# Patient Record
Sex: MALE | URBAN - METROPOLITAN AREA
[De-identification: names, ages, dates, MRNs, and addresses within clinical notes are randomized per-mention and may not be internally consistent; named-entity substitution may affect disease eponyms.]

---

## 2023-06-05 ENCOUNTER — APPOINTMENT (OUTPATIENT)
Dept: URBAN - METROPOLITAN AREA CLINIC 256 | Age: 73
Setting detail: DERMATOLOGY
End: 2023-06-06

## 2023-06-05 VITALS — HEIGHT: 69 IN | WEIGHT: 175 LBS

## 2023-06-05 DIAGNOSIS — D18.0 HEMANGIOMA: ICD-10-CM

## 2023-06-05 DIAGNOSIS — Z71.89 OTHER SPECIFIED COUNSELING: ICD-10-CM

## 2023-06-05 DIAGNOSIS — L82.0 INFLAMED SEBORRHEIC KERATOSIS: ICD-10-CM

## 2023-06-05 DIAGNOSIS — L57.8 OTHER SKIN CHANGES DUE TO CHRONIC EXPOSURE TO NONIONIZING RADIATION: ICD-10-CM

## 2023-06-05 DIAGNOSIS — L82.1 OTHER SEBORRHEIC KERATOSIS: ICD-10-CM

## 2023-06-05 DIAGNOSIS — D22 MELANOCYTIC NEVI: ICD-10-CM

## 2023-06-05 PROBLEM — D22.39 MELANOCYTIC NEVI OF OTHER PARTS OF FACE: Status: ACTIVE | Noted: 2023-06-05

## 2023-06-05 PROBLEM — D22.61 MELANOCYTIC NEVI OF RIGHT UPPER LIMB, INCLUDING SHOULDER: Status: ACTIVE | Noted: 2023-06-05

## 2023-06-05 PROBLEM — D22.71 MELANOCYTIC NEVI OF RIGHT LOWER LIMB, INCLUDING HIP: Status: ACTIVE | Noted: 2023-06-05

## 2023-06-05 PROBLEM — D22.72 MELANOCYTIC NEVI OF LEFT LOWER LIMB, INCLUDING HIP: Status: ACTIVE | Noted: 2023-06-05

## 2023-06-05 PROBLEM — D18.01 HEMANGIOMA OF SKIN AND SUBCUTANEOUS TISSUE: Status: ACTIVE | Noted: 2023-06-05

## 2023-06-05 PROBLEM — D22.5 MELANOCYTIC NEVI OF TRUNK: Status: ACTIVE | Noted: 2023-06-05

## 2023-06-05 PROBLEM — D22.62 MELANOCYTIC NEVI OF LEFT UPPER LIMB, INCLUDING SHOULDER: Status: ACTIVE | Noted: 2023-06-05

## 2023-06-05 PROCEDURE — OTHER LIQUID NITROGEN: OTHER

## 2023-06-05 PROCEDURE — 99213 OFFICE O/P EST LOW 20 MIN: CPT | Mod: 25

## 2023-06-05 PROCEDURE — OTHER MIPS QUALITY: OTHER

## 2023-06-05 PROCEDURE — OTHER COUNSELING: OTHER

## 2023-06-05 PROCEDURE — 17110 DESTRUCT B9 LESION 1-14: CPT

## 2023-06-05 ASSESSMENT — LOCATION SIMPLE DESCRIPTION DERM
LOCATION SIMPLE: LEFT UPPER ARM
LOCATION SIMPLE: LEFT POSTERIOR THIGH
LOCATION SIMPLE: RIGHT POSTERIOR THIGH
LOCATION SIMPLE: LEFT CHEEK
LOCATION SIMPLE: RIGHT ANTERIOR NECK
LOCATION SIMPLE: LEFT LOWER BACK
LOCATION SIMPLE: RIGHT UPPER ARM

## 2023-06-05 ASSESSMENT — LOCATION DETAILED DESCRIPTION DERM
LOCATION DETAILED: RIGHT CLAVICULAR NECK
LOCATION DETAILED: LEFT DISTAL POSTERIOR UPPER ARM
LOCATION DETAILED: LEFT INFERIOR MEDIAL MIDBACK
LOCATION DETAILED: LEFT DISTAL POSTERIOR THIGH
LOCATION DETAILED: RIGHT DISTAL POSTERIOR UPPER ARM
LOCATION DETAILED: LEFT INFERIOR CENTRAL MALAR CHEEK
LOCATION DETAILED: RIGHT DISTAL POSTERIOR THIGH
LOCATION DETAILED: LEFT SUPERIOR MEDIAL MIDBACK

## 2023-06-05 ASSESSMENT — LOCATION ZONE DERM
LOCATION ZONE: NECK
LOCATION ZONE: LEG
LOCATION ZONE: FACE
LOCATION ZONE: TRUNK
LOCATION ZONE: ARM

## 2023-06-05 NOTE — PROCEDURE: LIQUID NITROGEN
Duration Of Freeze Thaw-Cycle (Seconds): 5-10
Spray Paint Text: The liquid nitrogen was applied to the skin utilizing a spray paint frosting technique.
Render Post-Care Instructions In Note?: no
Post-Care Instructions: I reviewed with the patient in detail post-care instructions. Patient is to wear sunprotection, and avoid picking at any of the treated lesions. Pt may apply Vaseline to crusted or scabbing areas.
Medical Necessity Clause: This procedure was medically necessary because the lesions that were treated were:
Number Of Freeze-Thaw Cycles: 1 freeze-thaw cycle
Show Topical Anesthesia Variable?: Yes
Medical Necessity Information: It is in your best interest to select a reason for this procedure from the list below. All of these items fulfill various CMS LCD requirements except the new and changing color options.
Detail Level: Detailed
Consent: The patient's consent was obtained including but not limited to risks of crusting, scabbing, blistering, scarring, darker or lighter pigmentary change, recurrence, incomplete removal and infection.
Application Tool (Optional): Liquid Nitrogen Sprayer

## 2023-06-05 NOTE — PROCEDURE: MIPS QUALITY
Quality 431: Preventive Care And Screening: Unhealthy Alcohol Use - Screening: Patient not identified as an unhealthy alcohol user when screened for unhealthy alcohol use using a systematic screening method
Detail Level: Detailed
Quality 111:Pneumonia Vaccination Status For Older Adults: Patient received any pneumococcal conjugate or polysaccharide vaccine on or after their 60th birthday and before the end of the measurement period
Quality 130: Documentation Of Current Medications In The Medical Record: Current Medications Documented
Quality 226: Preventive Care And Screening: Tobacco Use: Screening And Cessation Intervention: Patient screened for tobacco use and is an ex/non-smoker
Quality 110: Preventive Care And Screening: Influenza Immunization: Influenza Immunization Administered during Influenza season
Quality 47: Advance Care Plan: Advance Care Planning discussed and documented in the medical record; patient did not wish or was not able to name a surrogate decision maker or provide an advance care plan.

## 2023-06-05 NOTE — HPI: FULL BODY SKIN EXAMINATION
What Is The Reason For Today's Visit?: Full Body Skin Examination
What Is The Reason For Today's Visit? (Being Monitored For X): concerning skin lesions on an annual basis
Additional History: The patient has areas of concern on the right shoulder that he states are irritated and itchy.

## 2024-10-23 ENCOUNTER — APPOINTMENT (OUTPATIENT)
Dept: URBAN - METROPOLITAN AREA CLINIC 256 | Age: 74
Setting detail: DERMATOLOGY
End: 2024-10-23

## 2024-10-23 VITALS — WEIGHT: 170 LBS | HEIGHT: 69 IN

## 2024-10-23 DIAGNOSIS — L57.0 ACTINIC KERATOSIS: ICD-10-CM

## 2024-10-23 DIAGNOSIS — L57.8 OTHER SKIN CHANGES DUE TO CHRONIC EXPOSURE TO NONIONIZING RADIATION: ICD-10-CM

## 2024-10-23 DIAGNOSIS — D22 MELANOCYTIC NEVI: ICD-10-CM

## 2024-10-23 DIAGNOSIS — D18.0 HEMANGIOMA: ICD-10-CM

## 2024-10-23 DIAGNOSIS — L82.1 OTHER SEBORRHEIC KERATOSIS: ICD-10-CM

## 2024-10-23 DIAGNOSIS — D485 NEOPLASM OF UNCERTAIN BEHAVIOR OF SKIN: ICD-10-CM

## 2024-10-23 DIAGNOSIS — Z71.89 OTHER SPECIFIED COUNSELING: ICD-10-CM

## 2024-10-23 DIAGNOSIS — Z85.828 PERSONAL HISTORY OF OTHER MALIGNANT NEOPLASM OF SKIN: ICD-10-CM

## 2024-10-23 PROBLEM — D22.72 MELANOCYTIC NEVI OF LEFT LOWER LIMB, INCLUDING HIP: Status: ACTIVE | Noted: 2024-10-23

## 2024-10-23 PROBLEM — D18.01 HEMANGIOMA OF SKIN AND SUBCUTANEOUS TISSUE: Status: ACTIVE | Noted: 2024-10-23

## 2024-10-23 PROBLEM — D48.5 NEOPLASM OF UNCERTAIN BEHAVIOR OF SKIN: Status: ACTIVE | Noted: 2024-10-23

## 2024-10-23 PROBLEM — D22.5 MELANOCYTIC NEVI OF TRUNK: Status: ACTIVE | Noted: 2024-10-23

## 2024-10-23 PROBLEM — D22.71 MELANOCYTIC NEVI OF RIGHT LOWER LIMB, INCLUDING HIP: Status: ACTIVE | Noted: 2024-10-23

## 2024-10-23 PROBLEM — D22.39 MELANOCYTIC NEVI OF OTHER PARTS OF FACE: Status: ACTIVE | Noted: 2024-10-23

## 2024-10-23 PROBLEM — D22.61 MELANOCYTIC NEVI OF RIGHT UPPER LIMB, INCLUDING SHOULDER: Status: ACTIVE | Noted: 2024-10-23

## 2024-10-23 PROBLEM — D22.62 MELANOCYTIC NEVI OF LEFT UPPER LIMB, INCLUDING SHOULDER: Status: ACTIVE | Noted: 2024-10-23

## 2024-10-23 PROCEDURE — OTHER EDUCATIONAL RESOURCES PROVIDED: OTHER

## 2024-10-23 PROCEDURE — 11102 TANGNTL BX SKIN SINGLE LES: CPT

## 2024-10-23 PROCEDURE — OTHER MIPS QUALITY: OTHER

## 2024-10-23 PROCEDURE — OTHER PHOTO-DOCUMENTATION: OTHER

## 2024-10-23 PROCEDURE — OTHER LIQUID NITROGEN: OTHER

## 2024-10-23 PROCEDURE — 17003 DESTRUCT PREMALG LES 2-14: CPT

## 2024-10-23 PROCEDURE — 17000 DESTRUCT PREMALG LESION: CPT | Mod: 59

## 2024-10-23 PROCEDURE — 99213 OFFICE O/P EST LOW 20 MIN: CPT | Mod: 25

## 2024-10-23 PROCEDURE — OTHER BIOPSY BY SHAVE METHOD: OTHER

## 2024-10-23 PROCEDURE — OTHER COUNSELING: OTHER

## 2024-10-23 ASSESSMENT — LOCATION DETAILED DESCRIPTION DERM
LOCATION DETAILED: LEFT LATERAL ABDOMEN
LOCATION DETAILED: LEFT VENTRAL PROXIMAL FOREARM
LOCATION DETAILED: RIGHT DISTAL POSTERIOR THIGH
LOCATION DETAILED: LEFT LATERAL INFERIOR CHEST
LOCATION DETAILED: RIGHT MEDIAL SUPERIOR CHEST
LOCATION DETAILED: LEFT MID-UPPER BACK
LOCATION DETAILED: RIGHT ANTERIOR DISTAL THIGH
LOCATION DETAILED: LEFT DISTAL POSTERIOR UPPER ARM
LOCATION DETAILED: LEFT CENTRAL BUCCAL CHEEK
LOCATION DETAILED: LEFT DISTAL POSTERIOR THIGH
LOCATION DETAILED: RIGHT DISTAL POSTERIOR UPPER ARM
LOCATION DETAILED: LEFT INFERIOR MEDIAL MIDBACK
LOCATION DETAILED: LEFT MEDIAL MALAR CHEEK
LOCATION DETAILED: RIGHT MEDIAL MALAR CHEEK
LOCATION DETAILED: LEFT ANTERIOR PROXIMAL THIGH
LOCATION DETAILED: LEFT INFERIOR CENTRAL MALAR CHEEK
LOCATION DETAILED: RIGHT VENTRAL DISTAL FOREARM
LOCATION DETAILED: RIGHT NASAL ALA

## 2024-10-23 ASSESSMENT — LOCATION SIMPLE DESCRIPTION DERM
LOCATION SIMPLE: LEFT POSTERIOR THIGH
LOCATION SIMPLE: RIGHT THIGH
LOCATION SIMPLE: LEFT THIGH
LOCATION SIMPLE: LEFT LOWER BACK
LOCATION SIMPLE: LEFT FOREARM
LOCATION SIMPLE: ABDOMEN
LOCATION SIMPLE: LEFT UPPER ARM
LOCATION SIMPLE: LEFT UPPER BACK
LOCATION SIMPLE: RIGHT UPPER ARM
LOCATION SIMPLE: RIGHT FOREARM
LOCATION SIMPLE: CHEST
LOCATION SIMPLE: LEFT CHEEK
LOCATION SIMPLE: RIGHT NOSE
LOCATION SIMPLE: RIGHT POSTERIOR THIGH
LOCATION SIMPLE: RIGHT CHEEK

## 2024-10-23 ASSESSMENT — LOCATION ZONE DERM
LOCATION ZONE: TRUNK
LOCATION ZONE: LEG
LOCATION ZONE: NOSE
LOCATION ZONE: FACE
LOCATION ZONE: ARM

## 2024-10-23 NOTE — HPI: FULL BODY SKIN EXAMINATION
What Is The Reason For Today's Visit?: Full Body Skin Examination
What Is The Reason For Today's Visit? (Being Monitored For X): the development of new lesions
Additional History: Left cheek, 2 weeks, bleeding, not healing

## 2024-10-23 NOTE — PROCEDURE: BIOPSY BY SHAVE METHOD
Detail Level: Detailed
Depth Of Biopsy: dermis
Was A Bandage Applied: Yes
Size Of Lesion In Cm: 0
Anticipated Plan (Based On Presumed Biopsy Results): Ald[place]
Biopsy Type: H and E
Biopsy Method: Dermablade
Anesthesia Type: 1% lidocaine with epinephrine
Anesthesia Volume In Cc: 0.5
Hemostasis: Drysol
Wound Care: Petrolatum
Dressing: bandage
Destruction After The Procedure: No
Type Of Destruction Used: Curettage
Curettage Text: The wound bed was treated with curettage after the biopsy was performed.
Cryotherapy Text: The wound bed was treated with cryotherapy after the biopsy was performed.
Electrodesiccation Text: The wound bed was treated with electrodesiccation after the biopsy was performed.
Electrodesiccation And Curettage Text: The wound bed was treated with electrodesiccation and curettage after the biopsy was performed.
Silver Nitrate Text: The wound bed was treated with silver nitrate after the biopsy was performed.
Lab: -[id number]
Path Notes (To The Dermatopathologist): Please check margins.
Consent: Written consent was obtained and risks were reviewed including but not limited to scarring, infection, bleeding, scabbing, incomplete removal, nerve damage and allergy to anesthesia.
Post-Care Instructions: I reviewed with the patient in detail post-care instructions. Patient is to keep the biopsy site dry overnight, and then apply bacitracin twice daily until healed. Patient may apply hydrogen peroxide soaks to remove any crusting.
Notification Instructions: Patient will be notified of biopsy results. However, patient instructed to call the office if not contacted within 2 weeks.
Billing Type: Third-Party Bill
Information: Selecting Yes will display possible errors in your note based on the variables you have selected. This validation is only offered as a suggestion for you. PLEASE NOTE THAT THE VALIDATION TEXT WILL BE REMOVED WHEN YOU FINALIZE YOUR NOTE. IF YOU WANT TO FAX A PRELIMINARY NOTE YOU WILL NEED TO TOGGLE THIS TO 'NO' IF YOU DO NOT WANT IT IN YOUR FAXED NOTE.

## 2024-10-28 ENCOUNTER — RX ONLY (RX ONLY)
Age: 74
End: 2024-10-28

## 2024-10-28 RX ORDER — IMIQUIMOD 12.5 MG/.25G
CREAM TOPICAL
Qty: 12 | Refills: 0 | Status: ERX | COMMUNITY
Start: 2024-10-28

## 2024-11-11 ENCOUNTER — TRANSCRIBE ORDERS (OUTPATIENT)
Dept: ONCOLOGY | Facility: CLINIC | Age: 74
End: 2024-11-11
Payer: COMMERCIAL

## 2024-11-11 ENCOUNTER — PATIENT OUTREACH (OUTPATIENT)
Dept: ONCOLOGY | Facility: CLINIC | Age: 74
End: 2024-11-11
Payer: COMMERCIAL

## 2024-11-11 DIAGNOSIS — C61 MALIGNANT NEOPLASM OF PROSTATE (H): Primary | ICD-10-CM

## 2024-11-11 NOTE — PROGRESS NOTES
New Patient Oncology Nurse Navigator Note     Referring provider:   Dave Rodriguez MD  [facility], [place], [place]      Referred to (specialty): Medical Oncology    Requested provider (if applicable):   Dr. Pearson or Dr. Hightower     Date Referral Received:   11/11/24     Evaluation for :   Second opinion of prostate cancer     Clinical History (per Nurse review of records provided):    Per note of Dr. George:    Hematology/Oncology Progress Note     Diagnosis:  1.                       Metastatic Prostate Cancer to the bones (spine, pelvis, and sternum)  2.                       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                       History of BCC of face     Treatment:  1.                       (11/24/2023 - 12/15/2023) Initiated Casodex  2.                       (12/11/2023) Started Lupron and Docetaxel  a.                       (03/26/2024) Completed 6 cycles of Docetaxel.   b.                      (Present) Lupron Q3M  3.                       (01/02/2024 - Present) Darolutomide      History of Present Illness:   Jd Restrepo is a 74 y.o. male with history of left nephrectomy, HLD, GERD,  asthma, newly diagnosed metastatic prostate cancer (extensive bone mets), and right renal mass (biopsy c/w papillary renal cell carcinoma) who presents to clinic on 12/11/23 for initiation of Lupron and Docetaxel.      09/16/2023 Urology visit for lower urinary tract symptoms. Relatively low PSA in the past. 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   - Bladder US showed an enlarged prostate measuring 6.2 x 4.3 x 4.7 cm  - Cystoscopy was completed due to urinary retention. This showed severe trabeculation fo the baldder, he had enlarged prostate with an intravesical protion and lateral lobe enlargement. Discussed TURP procedure. Plan was to  undergo a Urodynamic study.      11/16/2023   - PCP visit with Jarvis Gaxiola MD for fever. There was urinary retention and self cathing. Appeared septic with tachycardia, hypotension, and tachypnea. There is some loss of appetite. Referred to ED for admission.   - CT CAP showed no convincing evidence to confirm an etiology for fever in the chest, abdomen, or pelvis. Multiple small indistinct and infiltrative-appearing sclerotic areas throughout the spine, pelvis, and sternum. Metastatic disease cannot be excluded. Prostate-specific antigen elevation from 10/11/2023 (19.9 at that time) raises suspicion for the prostate as a source. Indeterminate density 2.6 cm structure in the right kidney. This could potentially represent a hemorrhagic/proteinaceous cyst, but a hypoenhancing solid mass cannot be excluded.     11/17/2023 Right iliac bone biopsy. Pathology showed metastatic adenocarcinoma, consistent with prostate primary.      11/28/2023: Right kidney needle core biopsy. Pathology showed renal cell carcinoma, favor papillary type, Grade 2     12/05/2023 Pet/CT scan showed findings suspicious for left prostate gland adenocarcinoma with widespread osseous metastases. No radiotracer uptake is identified within the biopsy-proven right papillary renal carcinoma.     01/23/2024 No BRACA 1 or 2 mutations. There was a + PODL1 mutation of unknown significance.      05/01/2024  - CT CAP showed no findings for new or progressive sites of metastatic disease. Increased density of diffuse sclerotic metastases may represent healing change. Stable right RCC.  - Bone scan showed widespread osteoblastic metastatic disease.      06/05/2024 Prostate chips, transurethral resection. Pathology showed prostatic adenocarcinoma, Grade Group 5, Patterson grade 5+5 (Score 10). 7% tissue involvement.      Interim History:  Jd Restrepo is a 74 y.o. male who presents to the clinic today for a follow up on metastatic prostate cancer. He  completed chemotherapy with Docetaxel on 2024. He has been on Lupron since 2023 and Darolutamide was later started on 2024. He also recently underwent a TURP procedure on 2024 and feels this provided some symptomatic improvement with the urinary symptoms. Otherwise, he feels his stamina has diminished since being on treatment. He enjoys playing golf but notes he used to be able to carry his bags up and down hills and walk at least 5 miles, but he can't do that now. He also feels his strength is decreased as well. He is currently on calcium and folic acid supplementation. He is struggling with waking up several nights and it's hard to get back to sleep but notes Lorazepam really improved that and he would like to know if this is a good option for him to use PRN. His next bone and CT scan is scheduled for 2024. He is due to receive his next Lupron dose.      Social History:   Marital status:   to [place] with 2 boys  Current Tobacco use: Never  Current Alcohol Use: 1 - 2 drinks per day (Wine/Liquor) - None lately  Daily Activities: Retired from [employer]. 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 Enjoys Golf.   They do travel during the Winter - Next Travel is in 2024.      Physical Exam:  Vitals reviewed in EPIC.  General: A/O, NAD.  HEENT: MMM, no lesions. Neck supple.   CV: RRR, no murmurs.  Lungs: CTAB, no crackles.  Abd: Soft, NTND.  Lymph: No cervical, supraclavicular, axillary LAD.  Extrem: No vertebral TTP. No edema.  Skin: No jaundice or petechaie.  Neuro: Grossly normal strength/sensation.   Psych: appropriate mood and affect.      Labs:   2024 labs was reviewed in epic, including CBC, CMP, LFT's, Proteins, PSA, and Iron Studies. Lab trends was reviewed. PSA remains undetectable.      Imagin2024 Bone scan report and images was personally reviewed.         NM Bone Scan Whole Body  Order: [id number]   Status: Final result       Visible to patient: Yes (seen)        Next appt: 11/08/2024 at 02:00 PM in Chemo Therapy/Infusion Services (Oncology)       Dx: Prostate cancer metastatic to bone ([facility])    Details     Reading Physician Reading Date Result Priority   Tre Jacobo MD  [phone] 11/5/2024 Routine      Narrative & Impression  TECHNIQUE:  The procedure was performed with 21.3 mCi Tc-99m MDP.  Anterior and posterior whole body images were obtained.     COMPARISON:  Bone scan 5/01/2024.     FINDINGS:  Similar findings of widespread osseous metastases involving the axial and appendicular skeleton. No definite new or more intense sites of involvement. Most intense lesion is the right first rib. The right kidney is visualized. Left nephrectomy changes.     IMPRESSION  No significant interval change in diffuse tracer avid osseous metastases.         COMPARISON: CT chest, abdomen, and pelvis, 5/1/2024.     TECHNIQUE:  Images were obtained through the chest, abdomen and pelvis following the administration of 75 mL IOHEXOL 350 MG/ML IV SOLN IV contrast.      FINDINGS:     CHEST:  CHEST WALL AND LOWER NECK: Thyroid gland is unremarkable. No abnormal axillary lymph nodes.     HEART AND VASCULATURE: Normal heart size. No pericardial effusion. No thoracic aortic aneurysm.      MEDIASTINUM: Unremarkable esophagus. No adenopathy.     LUNGS AND PLEURAL SPACE: Patent central airways. Improving reticulonodular opacities in the periphery of the lower lobes, right greater than left, since the 5/1/2024 CT exam. 2 additional sub-4 mm solid pulmonary nodules are unchanged. For example, a 2 mm solid juxtapleural nodule in the right upper lobe is unchanged (series 3, image 48). Linear scarring in the inferior aspect of the left lower lobe. No pleural effusion.     ABDOMEN/PELVIS:  LIVER: Stable 5 mm low-attenuation lesion in the right lobe of the liver (series 2, image 60). Simple cyst in the liver adjacent to the gallbladder fossa (series 2, image 57). Otherwise unremarkable.      GALLBLADDER AND BILIARY TREE: Unremarkable. No intrahepatic or extrahepatic biliary ductal dilation.      PANCREAS: Unremarkable.     SPLEEN: Unremarkable.     ADRENALS: Unremarkable.     KIDNEYS, URETERS, AND BLADDER: Stable postoperative changes from a left nephrectomy. 25 x 21 mm low-attenuation lesion in the interpolar region of the right kidney previously measured 28 x 24 mm (series 2, and 67). Biopsy-proven renal cell carcinoma. Additional simple cysts in the right kidney. No hydronephrosis. Circumferential bladder wall thickening accentuated secondary to incomplete distention.     VESSELS: Mild atherosclerotic calcifications of the abdominal aorta and iliac vessels. No abdominal aortic aneurysm.     BOWEL: Colonic diverticulosis without acute diverticulitis. Small bowel normal in caliber. Appendix unremarkable.     REPRODUCTIVE ORGANS: Postoperative changes from a TURP. 7 mm nodule within the resection cavity (series 2, image 119).     MESENTERY/PERITONEUM: No enlarged mesenteric lymph nodes. No ascites or free air. No focal fluid collection.      RETROPERITONEUM: No adenopathy.      ABDOMINAL WALL/SOFT TISSUES: Small fat-containing umbilical hernia.     BONES: Diffuse sclerotic lesions throughout the visualized skeleton are not significantly changed since the 5/1/2024 CT exam. Degenerative changes in the spine. No acute osseous abnormality.     IMPRESSION  1. Diffuse sclerotic metastasis throughout the visualized skeleton not significantly changed since the 5/1/2024 CT exam.  2. Interval decrease in size of a biopsy-proven renal cell carcinoma in the right kidney.  3. Stable postoperative changes from a left nephrectomy.  4. Postoperative changes from a TURP.  5. Left-sided colonic diverticulosis without acute diverticulitis.     Assessment and Plan:   #. Stage IV Metastatic Prostate Cancer Castrate Sensitive to the bones (spine, pelvis, and sternum) with extensive bone mets which would be considered  high-risk, high-volume     Goal of Treatment: Palliative, aim to alleviate/prevent symptoms and prolong survival, but without expectation of cure.     Disease Status: S/p 6 cycles of Docetaxel (03/26/2024)     PSA now undetectable sine 03/26/2024. Discussed trend of alkaline phosphatase improving; now 69 from 791 on 01/23/2024.     Treatment Plan: Continue Lupron Q3M (Due 11/12/2024) and Daralutomide      Scans show partial remission. Plan is to continue Lupron and Daralutomide. The PSA will determine if there is any changes therefore we will get labs Q3M moving forward. Next scans will be due in 3 months (CT CAP and Bone Scan).     #. Anemia and Fatigue              - Anemia is resolved. Can now stop folic acid. I think some of his fatigue issues are due to the Lupron.      #. Insomnia              -  Ok to continue Ativan on a PRN basis. We did previously discuss alternative options including Temazepam which could last for longer increments, but we will stay with Ativan for now and revisit this should he require this more often.      #. Bone Mets  - I think the bone scans are more indicative of healing bone and resolving extensive marrow involvement but we will take a closer look as we go forward. Next scans in 3 months.      #. Bladder incontinence and Renal Cell Papillary Carcinoma  - TURP completed on 06/05/2024 by Dr. Garcia with symptomatic improvement s/p procedure. He will continue to follow with Urology. Await next scans and then potentially discuss with Dr. Garcia the plans moving forward.             Dave Rodriguez MD, MS          Records Location (Care Everywhere, Media, etc.):  RECORDS NEEDED:  [facility]  Upcoming [facility] PET on  11/26/24  [facility] testing     Additional testing needed prior to consult: [facility] PET 11/26    I called Jd to discuss referral to oncology.  I introduced my role and reviewed what this consult visit will entail/what to expect.  I reviewed the location and gave contact  numbers including new patient scheduling and clinic phone numbers.   Jd has no other questions at this time.    I let Jd know that once records are in process our new patient scheduling team with be calling to finalize scheduling for the following plan:    PLAN:   1) SCHEDULE: Hold: Dr. Hightower, 12/2, [facility] [place], 10-11 AM, NEW, in person  2) Schedule for a lab draw following the consultation      Regla Copeland RN, BSN  Oncology New Patient Nurse Navigator   [facility]  [phone]

## 2024-11-12 ENCOUNTER — PRE VISIT (OUTPATIENT)
Dept: ONCOLOGY | Facility: CLINIC | Age: 74
End: 2024-11-12
Payer: COMMERCIAL

## 2024-11-12 NOTE — TELEPHONE ENCOUNTER
RECORDS STATUS - ALL OTHER DIAGNOSIS      RECORDS RECEIVED FROM:    Appt Date: TBD NN WQ    - Malignant neoplasm of prostate ([facility])     Action    Action Taken 11/12/2024 10:59AM REVA     I called [facility] Ph: [phone] - unavailable. I sent a fax request.       NOTES STATUS DETAILS   OFFICE NOTE from referring provider    Dave Rodriguez MD      OFFICE NOTE from medical oncologist     DISCHARGE SUMMARY from hospital     DISCHARGE REPORT from the ER     OPERATIVE REPORT     MEDICATION LIST     CLINICAL TRIAL TREATMENTS TO DATE     LABS     PATHOLOGY REPORTS     ANYTHING RELATED TO DIAGNOSIS CE Labs last updated on 11/5/2024    PATHOLOGY [facility] TRACKING   TRACKING #:   GENONOMIC TESTING     TYPE:     IMAGING (NEED IMAGES & REPORT)     CT SCANS CE CT Chest Abdomen Pelvis 11/5/2024, 5/1/2024, 11/16/2023     MRI     NM Bone Scan CE 11/5/2024, 5/1/2024    DXA Bone Density  CE  4/26/2024    Xray Chest CE  11/16/2023   IMAGE DISC FEDEX TRACKING   TRACKING #:

## 2024-11-14 ENCOUNTER — APPOINTMENT (OUTPATIENT)
Dept: URBAN - METROPOLITAN AREA CLINIC 256 | Age: 74
Setting detail: DERMATOLOGY
End: 2024-11-14

## 2024-11-14 DIAGNOSIS — L24.4 IRRITANT CONTACT DERMATITIS DUE TO DRUGS IN CONTACT WITH SKIN: ICD-10-CM

## 2024-11-14 PROCEDURE — 99213 OFFICE O/P EST LOW 20 MIN: CPT

## 2024-11-14 PROCEDURE — OTHER EDUCATIONAL RESOURCES PROVIDED: OTHER

## 2024-11-14 PROCEDURE — OTHER PHOTO-DOCUMENTATION: OTHER

## 2024-11-14 PROCEDURE — OTHER PRESCRIPTION MEDICATION MANAGEMENT: OTHER

## 2024-11-14 PROCEDURE — OTHER COUNSELING: OTHER

## 2024-11-14 PROCEDURE — OTHER MIPS QUALITY: OTHER

## 2024-11-14 ASSESSMENT — SEVERITY ASSESSMENT 2020: SEVERITY 2020: SEVERE

## 2024-11-14 ASSESSMENT — LOCATION DETAILED DESCRIPTION DERM: LOCATION DETAILED: LEFT CENTRAL BUCCAL CHEEK

## 2024-11-14 ASSESSMENT — LOCATION ZONE DERM: LOCATION ZONE: FACE

## 2024-11-14 ASSESSMENT — PAIN INTENSITY VAS: HOW INTENSE IS YOUR PAIN 0 BEING NO PAIN, 10 BEING THE MOST SEVERE PAIN POSSIBLE?: NO PAIN

## 2024-11-14 ASSESSMENT — LOCATION SIMPLE DESCRIPTION DERM: LOCATION SIMPLE: LEFT CHEEK

## 2024-11-14 NOTE — PROCEDURE: PRESCRIPTION MEDICATION MANAGEMENT
Render In Strict Bullet Format?: No
Detail Level: Zone
Discontinue Regimen: Imiquimod 5% Topical Cream

## 2024-12-02 ENCOUNTER — ONCOLOGY VISIT (OUTPATIENT)
Dept: ONCOLOGY | Facility: CLINIC | Age: 74
End: 2024-12-02
Attending: INTERNAL MEDICINE
Payer: COMMERCIAL

## 2024-12-02 VITALS
HEART RATE: 74 BPM | DIASTOLIC BLOOD PRESSURE: 89 MMHG | BODY MASS INDEX: 26.52 KG/M2 | TEMPERATURE: 98.1 F | SYSTOLIC BLOOD PRESSURE: 163 MMHG | WEIGHT: 175 LBS | HEIGHT: 68 IN | OXYGEN SATURATION: 98 % | RESPIRATION RATE: 18 BRPM

## 2024-12-02 DIAGNOSIS — C61 PROSTATE CANCER METASTATIC TO BONE (H): ICD-10-CM

## 2024-12-02 DIAGNOSIS — C61 MALIGNANT NEOPLASM OF PROSTATE (H): ICD-10-CM

## 2024-12-02 DIAGNOSIS — C61 HORMONE RESISTANT PROSTATE CANCER (H): Primary | ICD-10-CM

## 2024-12-02 DIAGNOSIS — Z19.2 HORMONE RESISTANT PROSTATE CANCER (H): Primary | ICD-10-CM

## 2024-12-02 DIAGNOSIS — C79.51 PROSTATE CANCER METASTATIC TO BONE (H): ICD-10-CM

## 2024-12-02 PROCEDURE — 99417 PROLNG OP E/M EACH 15 MIN: CPT | Performed by: STUDENT IN AN ORGANIZED HEALTH CARE EDUCATION/TRAINING PROGRAM

## 2024-12-02 PROCEDURE — 99213 OFFICE O/P EST LOW 20 MIN: CPT | Performed by: STUDENT IN AN ORGANIZED HEALTH CARE EDUCATION/TRAINING PROGRAM

## 2024-12-02 PROCEDURE — 99205 OFFICE O/P NEW HI 60 MIN: CPT | Performed by: STUDENT IN AN ORGANIZED HEALTH CARE EDUCATION/TRAINING PROGRAM

## 2024-12-02 RX ORDER — DAROLUTAMIDE 300 MG/1
600 TABLET, FILM COATED ORAL
COMMUNITY
Start: 2024-01-02

## 2024-12-02 RX ORDER — ALBUTEROL SULFATE 90 UG/1
2 INHALANT RESPIRATORY (INHALATION)
COMMUNITY
Start: 2023-11-18

## 2024-12-02 RX ORDER — FLUTICASONE PROPIONATE 50 MCG
SPRAY, SUSPENSION (ML) NASAL
COMMUNITY
Start: 2023-08-10

## 2024-12-02 RX ORDER — LORAZEPAM 0.5 MG/1
.5-1 TABLET ORAL
COMMUNITY
Start: 2024-08-20

## 2024-12-02 RX ORDER — ATORVASTATIN CALCIUM 40 MG/1
TABLET, FILM COATED ORAL
COMMUNITY
Start: 2023-08-10

## 2024-12-02 ASSESSMENT — PAIN SCALES - GENERAL: PAINLEVEL_OUTOF10: NO PAIN (0)

## 2024-12-02 NOTE — LETTER
be excluded. Prostate-specific antigen elevation from 10/11/2023 (19.9 at that time) raises suspicion for the prostate as a source. Indeterminate density 2.6 cm structure in the right kidney. This could potentially represent a hemorrhagic/proteinaceous cyst, but a hypoenhancing solid mass cannot be excluded.    11/17/2023 Right iliac bone biopsy. Pathology showed metastatic adenocarcinoma, consistent with prostate primary.     12/05/2023 Pet/CT scan showed findings suspicious for left prostate gland adenocarcinoma with widespread osseous metastases. No radiotracer uptake is identified within the biopsy-proven right papillary renal carcinoma.    01/23/2024 No BRACA 1 or 2 mutations. There was a + PODL1 mutation of unknown significance.     05/01/2024  - CT CAP showed no findings for new or progressive sites of metastatic disease. Increased density of diffuse sclerotic metastases may represent healing change.   - Bone scan showed widespread osteoblastic metastatic disease.     06/05/2024 Prostate chips, transurethral resection. Pathology showed prostatic adenocarcinoma, Grade Group 5, [place] grade 5+5 (Score 10). 7% tissue involvement.     11/05/2024  - CT CAP showed diffuse sclerotic metastasis throughout the visualized skeleton not significantly changed since the 5/1/2024 CT exam.  Postoperative changes from a TURP. Left-sided colonic diverticulosis without acute diverticulitis.  PSA 0.3    - Bone scan showed no significant interval change in diffuse tracer avid osseous metastases.     # R papillary renal cell carcinoma.    -11/28/2023: Right kidney needle core biopsy. Pathology showed renal cell carcinoma, favor papillary type, Grade 2  -5/1/24Stable right RCC.  -11/5/24-Interval decrease in size of a biopsy-proven renal cell carcinoma in the right kidney. Stable postoperative changes from a left nephrectomy.    GENOMIC STUDIES: NGS from prostate biopsy pending.  Germline testing negative for BRCA1/2.  PODL1  "mutation of indeterminate significance.      TREATMENT HISTORY:     ADT  Docetaxel/darolutamide in [facility] setting.      GUIDELINES USED: NCCN    INTENT OF THERAPY: Palliative, discussed at 12/2/24 appointment.     CLINICAL TRIALS:  No available/open trials at initial visit.      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     cycloSPORINE (RESTASIS) 0.05 % ophthalmic emulsion 1 drop, 2 TIMES DAILY     FISH OIL No dose, route, or frequency recorded.     fluticasone (FLONASE) 50 MCG/ACT nasal spray      Glucosamine-Chondroit-Vit C-Mn (GLUCOSAMINE 1500 COMPLEX) CAPS Take  by mouth.     loratadine (CLARITIN) 10 mg, DAILY     LORazepam (ATIVAN) 0.5-1 mg     Nubeqa 600 mg     omeprazole (PRILOSEC) 20 MG DR capsule      simvastatin (ZOCOR) 20 MG tablet AT BEDTIME       ROS-Remainder of 14 point ROS reviewed and negative except as in HPI.    PHYSICAL EXAM:  ECOG-PS=1    BP (!) 163/89   Pulse 74   Temp 98.1  F (36.7  C) (Oral)   Resp 18   Ht 1.727 m (5' 8\")   Wt 79.4 kg (175 lb)   SpO2 98%   BMI 26.61 kg/m    Exam:  Constitutional: healthy, alert, and no distress  Head: Normocephalic.   Neck: Neck supple. No adenopathy grossly.   ENT: MMM  Cardiovascular: negative, No edema or JVD.  Respiratory: negative, normal WOB on RA   Gastrointestinal: Abdomen non-distended  : Deferred  Musculoskeletal: extremities normal- no gross deformities noted, gait normal, and normal muscle tone  Skin: no suspicious lesions or rashes on exposed areas of skin   Neurologic: negative, CNII-XII grossly intact, normal speech  Psychiatric: mentation appears normal and affect " normal/bright    LABS AND IMAGES:    PSMA PET from 11/25/24-Personally reviewed.  Multiple small foci of PSMA-avid prostate cancer noted.      PSA trend, see oncology history.      Labs 12/3/24  Testosterone 23  PSA 0.6  CBC WNL  CMP WNL with GFR >60mL/minute.  LFTs WNL.      IMPRESSION AND PLAN:    # Metastatic, hormone-resistant prostate cancer.    # prostate cancer metastatic to bone    Jd Restrepo is a 74 year old male referred for second opinion by Dr. Dave Rodriguez.  He was initially diagnosed in the fall of 2023 after presenting with lower urinary tract symptoms.  PSA was ~19 and bone metastases were present and biopsied, confirming prostate adenocarcinoma.  He was started on ADT/docetaxel/darolutamide which he tolerated well and continued with darolutamide.  PSA nicol was undetectable while on darolutamide, but began to rise in the fall of 2024.  Prostate chips from the summer of 2024 were consistent with Ariela 10.  PSA was 0.3 in November 2024 and 0.6 in December 2024.  PSMA PET was completed at the end of November 2024 with multiple small metastases with PSMA avidity.  Conventional scans were stable without evidence for new areas of disease.  He was otherwise feeling well at our first appointment and continued to tolerate darolutamide without issues.      Jd was very interested in immune therapy or other clinical trials along with the possibility of Pluvicto.  His prior diagnosis of papillary renal cell carcinoma complicates clinical trial enrollment.  At the moment, we do not have any immune therapies open in the [facility] setting.  We have two in the pipeline that will hopefully open in the next few months.  LAVA-1207 explicitly excludes any patient with prior malignancy; however, as worded currently the EHB555 Phase III of xaluritamig versus cabazitaxel or ARSI switch may allow this.  This is dependent upon the final protocol and sponsor approval as long as the concurrent malignancy does not  interfere with response assessment or lifespan.  I will keep on eye on this for Jd as a possibility, but there are no guarantees.      Regarding Pluvicto, Jd is clearly eligible with prior taxane and ARSI in the hormone sensitive setting.  We reviewed the isolation requirements and side effects of Pluvicto along with dosing scheduled of every 6 weeks for 6 doses.  He has no issues with complying with the isolation requirements and was interested in the possibility of Pluvicto.  I also discussed the side effects of dry mouth/dry eye, which can significantly worsen with Pluvicto, particularly in the context of low volume disease.  We reviewed his scans and showed the other areas of physiologic uptake in the salivary and lacrimal glands in particular, which were significantly larger volume than his current PSMA avid metastases.  At this point, my recommendation would be to watch and wait since he is feeling well and his PSA is low and there is no evidence for progression on conventional imaging.  My concern would be the significant and permanent dry mouth/dry eye with concentration of Pluvicto in the salivary and lacrimal glands.  We could certainly treat now as well if he feels particularly inclined to do so, but we agreed that holding off at the moment was the best option.      PLAN:   1. See Dr. Rodriguez later this week.   2. Labs tomorrow at [facility].   3. I will reach out to Dr. Rodriguez regarding treatment options and timing.    4. I will monitor for clinical trial openings here at the [facility] and final wording.  I will let you know if anything changes.    5. Return as needed with me since you are following closely with Dr. Rodriguez.  I am happy to start Pluvicto when you are ready and if the side effect risk is tolerable to you.      A total of 80 minutes spent on the date of the encounter doing chart review, review of test results, interpretation of tests, patient visit, and documentation. The patient  and his wife given the opportunity to ask multiple questions today, all of which were answered to their satisfaction.    Tan Hightower MD, PhD   of Medicine   Oncology/BMT/Cellular Therapies    Again, thank you for allowing me to participate in the care of your patient.        Sincerely,        Tan Hightower MD

## 2024-12-02 NOTE — NURSING NOTE
"Oncology Rooming Note    December 2, 2024 10:07 AM   Jd Restrepo is a 74 year old male who presents for:    Chief Complaint   Patient presents with    Oncology Clinic Visit     Malignant neoplasm of prostate     Initial Vitals: BP (!) 163/89   Pulse 74   Temp 98.1  F (36.7  C) (Oral)   Resp 18   Ht 1.727 m (5' 8\")   Wt 79.4 kg (175 lb)   SpO2 98%   BMI 26.61 kg/m   Estimated body mass index is 26.61 kg/m  as calculated from the following:    Height as of this encounter: 1.727 m (5' 8\").    Weight as of this encounter: 79.4 kg (175 lb). Body surface area is 1.95 meters squared.  No Pain (0) Comment: Data Unavailable   No LMP for male patient.  Allergies reviewed: Yes  Medications reviewed: Yes    Medications: Medication refills not needed today.  Pharmacy name entered into [facility]: [id number] [place] [address] [facility] [address] [facility] [address]  [address]    Frailty Screening:   Is the patient here for a new oncology consult visit in cancer care? 1. Yes. Over the past month, have you experienced difficulty or required a caregiver to assist with:   1. Balance, walking or general mobility (including any falls)? NO  2. Completion of self-care tasks such as bathing, dressing, toileting, grooming/hygiene?  NO  3. Concentration or memory that affects your daily life?  NO       Clinical concerns:  none      Atul Zhang LPN              "

## 2024-12-02 NOTE — Clinical Note
12/2/2024      Jd Restrepo  [address]  [place] [address]      Dear Colleague,    Thank you for referring your patient, Jd Restrepo, to the [facility]. Please see a copy of my visit note below.    No notes on file    Again, thank you for allowing me to participate in the care of your patient.        Sincerely,        Tan Hightower MD

## 2024-12-03 ENCOUNTER — LAB REQUISITION (OUTPATIENT)
Dept: LAB | Facility: CLINIC | Age: 74
End: 2024-12-03
Payer: COMMERCIAL

## 2024-12-03 PROCEDURE — 88321 CONSLTJ&REPRT SLD PREP ELSWR: CPT | Performed by: PATHOLOGY

## 2024-12-05 LAB
PATH REPORT.COMMENTS IMP SPEC: ABNORMAL
PATH REPORT.COMMENTS IMP SPEC: YES
PATH REPORT.FINAL DX SPEC: ABNORMAL
PATH REPORT.GROSS SPEC: ABNORMAL
PATH REPORT.RELEVANT HX SPEC: ABNORMAL
PATH REPORT.RELEVANT HX SPEC: ABNORMAL
PATH REPORT.SITE OF ORIGIN SPEC: ABNORMAL

## 2024-12-08 NOTE — PROGRESS NOTES
"  [facility] Medical Oncology Second Opinion Consultation Note       Date of visit: December 2, 2024    Dear Dr. Dave Rodriguez, thank you for referring your patient for a Second Opinion consultation at the [facility].  A brief overview of his oncological history as well as my recommendations follow below.    CC:  Second opinion for clinical trials versus Pluvicto.      HPI:  Overall feeling well and doesn't feel \"like has has cancer.\"  Tolerated docetaxel in [facility] setting pretty well overall without significant nausea.  Appetite is good.  Energy is good.  Gaining weight back since chemo  No weakness and no limitations regarding physical activity.      ONCOLOGY HISTORY:    #metastatic prostate cancer.    09/16/2023 Urology visit for lower urinary tract symptoms. Relatively low PSA in the past. 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   - Bladder US showed an enlarged prostate measuring 6.2 x 4.3 x 4.7 cm  - Cystoscopy was completed due to urinary retention. This showed severe trabeculation fo the baldder, he had enlarged prostate with an intravesical protion and lateral lobe enlargement. Discussed TURP procedure. Plan was to undergo a Urodynamic study.     11/16/2023   - PCP visit with Jarvis Gaxiola MD for fever. There was urinary retention and self cathing. Appeared septic with tachycardia, hypotension, and tachypnea. There is some loss of appetite. Referred to ED for admission.   - CT CAP showed no convincing evidence to confirm an etiology for fever in the chest, abdomen, or pelvis. Multiple small indistinct and infiltrative-appearing sclerotic areas throughout the spine, pelvis, and sternum. Metastatic disease cannot be excluded. Prostate-specific antigen elevation from 10/11/2023 (19.9 at that time) raises suspicion for the prostate as a source. Indeterminate density 2.6 cm structure in the right kidney. This could potentially represent a " hemorrhagic/proteinaceous cyst, but a hypoenhancing solid mass cannot be excluded.    11/17/2023 Right iliac bone biopsy. Pathology showed metastatic adenocarcinoma, consistent with prostate primary.     12/05/2023 Pet/CT scan showed findings suspicious for left prostate gland adenocarcinoma with widespread osseous metastases. No radiotracer uptake is identified within the biopsy-proven right papillary renal carcinoma.    01/23/2024 No BRACA 1 or 2 mutations. There was a + PODL1 mutation of unknown significance.     05/01/2024  - CT CAP showed no findings for new or progressive sites of metastatic disease. Increased density of diffuse sclerotic metastases may represent healing change.   - Bone scan showed widespread osteoblastic metastatic disease.     06/05/2024 Prostate chips, transurethral resection. Pathology showed prostatic adenocarcinoma, Grade Group 5, Ariela grade 5+5 (Score 10). 7% tissue involvement.     11/05/2024  - CT CAP showed diffuse sclerotic metastasis throughout the visualized skeleton not significantly changed since the 5/1/2024 CT exam.  Postoperative changes from a TURP. Left-sided colonic diverticulosis without acute diverticulitis.  PSA 0.3    - Bone scan showed no significant interval change in diffuse tracer avid osseous metastases.     # R papillary renal cell carcinoma.    -11/28/2023: Right kidney needle core biopsy. Pathology showed renal cell carcinoma, favor papillary type, Grade 2  -5/1/24Stable right RCC.  -11/5/24-Interval decrease in size of a biopsy-proven renal cell carcinoma in the right kidney. Stable postoperative changes from a left nephrectomy.    GENOMIC STUDIES: NGS from prostate biopsy pending.  Germline testing negative for BRCA1/2.  PODL1 mutation of indeterminate significance.      TREATMENT HISTORY:     [facility]      GUIDELINES USED: NCCN    INTENT OF THERAPY: Palliative, discussed at 12/2/24 appointment.     CLINICAL TRIALS:  No  "available/open trials at initial visit.      PMH:  Papillary RCC, currently monitoring   Prostate cancer     PSH:  L nephrectomy as child for non-malignant etiology     ALLERGIES: Cats, seasonal     MEDS:  Current Outpatient Medications   Medication Instructions    albuterol (PROAIR HFA/PROVENTIL HFA/VENTOLIN HFA) 108 (90 Base) MCG/ACT inhaler 2 puffs    atorvastatin (LIPITOR) 40 MG tablet     Calcium Carbonate (CALCIUM 500 PO) Take  by mouth.    cycloSPORINE (RESTASIS) 0.05 % ophthalmic emulsion 1 drop, 2 TIMES DAILY    FISH OIL No dose, route, or frequency recorded.    fluticasone (FLONASE) 50 MCG/ACT nasal spray     Glucosamine-Chondroit-Vit C-Mn (GLUCOSAMINE 1500 COMPLEX) CAPS Take  by mouth.    loratadine (CLARITIN) 10 mg, DAILY    LORazepam (ATIVAN) 0.5-1 mg    Nubeqa 600 mg    omeprazole (PRILOSEC) 20 MG DR capsule     simvastatin (ZOCOR) 20 MG tablet AT BEDTIME       ROS-Remainder of 14 point ROS reviewed and negative except as in HPI.    PHYSICAL EXAM:  ECOG-PS=1    BP (!) 163/89   Pulse 74   Temp 98.1  F (36.7  C) (Oral)   Resp 18   Ht 1.727 m (5' 8\")   Wt 79.4 kg (175 lb)   SpO2 98%   BMI 26.61 kg/m    Exam:  Constitutional: healthy, alert, and no distress  Head: Normocephalic.   Neck: Neck supple. No adenopathy grossly.   ENT: MMM  Cardiovascular: negative, No edema or JVD.  Respiratory: negative, normal WOB on RA   Gastrointestinal: Abdomen non-distended  : Deferred  Musculoskeletal: extremities normal- no gross deformities noted, gait normal, and normal muscle tone  Skin: no suspicious lesions or rashes on exposed areas of skin   Neurologic: negative, CNII-XII grossly intact, normal speech  Psychiatric: mentation appears normal and affect normal/bright    LABS AND IMAGES:    PSMA PET from 11/25/24-Personally reviewed.  Multiple small foci of PSMA-avid prostate cancer noted.      PSA trend, see oncology history.      Labs 12/3/24  Testosterone 23  PSA 0.6  CBC WNL  CMP WNL with GFR " >60mL/minute.  LFTs WNL.      IMPRESSION AND PLAN:    # Metastatic, hormone-resistant prostate cancer.    # prostate cancer metastatic to bone    Jd Restrepo is a 74 year old male referred for second opinion by Dr. Dave Rodriguez.  He was initially diagnosed in the fall of 2023 after presenting with lower urinary tract symptoms.  PSA was ~19 and bone metastases were present and biopsied, confirming prostate adenocarcinoma.  He was started on ADT/docetaxel/darolutamide which he tolerated well and continued with darolutamide.  PSA nicol was undetectable while on darolutamide, but began to rise in the fall of 2024.  Prostate chips from the summer of 2024 were consistent with Ariela 10.  PSA was 0.3 in November 2024 and 0.6 in December 2024.  PSMA PET was completed at the end of November 2024 with multiple small metastases with PSMA avidity.  Conventional scans were stable without evidence for new areas of disease.  He was otherwise feeling well at our first appointment and continued to tolerate darolutamide without issues.      Jd was very interested in immune therapy or other clinical trials along with the possibility of Pluvicto.  His prior diagnosis of papillary renal cell carcinoma complicates clinical trial enrollment.  At the moment, we do not have any immune therapies open in the [facility] setting.  We have two in the pipeline that will hopefully open in the next few months.  LAVA-1207 explicitly excludes any patient with prior malignancy; however, as worded currently the HEI270 Phase III of xaluritamig versus cabazitaxel or ARSI switch may allow this.  This is dependent upon the final protocol and sponsor approval as long as the concurrent malignancy does not interfere with response assessment or lifespan.  I will keep on eye on this for Jd as a possibility, but there are no guarantees.      Regarding Pluvicto, Jd is clearly eligible with prior taxane and ARSI in the hormone sensitive setting.  We  reviewed the isolation requirements and side effects of Pluvicto along with dosing scheduled of every 6 weeks for 6 doses.  He has no issues with complying with the isolation requirements and was interested in the possibility of Pluvicto.  I also discussed the side effects of dry mouth/dry eye, which can significantly worsen with Pluvicto, particularly in the context of low volume disease.  We reviewed his scans and showed the other areas of physiologic uptake in the salivary and lacrimal glands in particular, which were significantly larger volume than his current PSMA avid metastases.  At this point, my recommendation would be to watch and wait since he is feeling well and his PSA is low and there is no evidence for progression on conventional imaging.  My concern would be the significant and permanent dry mouth/dry eye with concentration of Pluvicto in the salivary and lacrimal glands.  We could certainly treat now as well if he feels particularly inclined to do so, but we agreed that holding off at the moment was the best option.      PLAN:   1. See Dr. Rodriguez later this week.   2. Labs tomorrow at [facility].   3. I will reach out to Dr. Rodriguez regarding treatment options and timing.    4. [facility] and final [facility].  I will let you know if anything changes.    5. Return as needed with me since you are following closely with Dr. Rodriguez.  I am happy to start Pluvicto when you are ready and if the side effect risk is tolerable to you.      A total of 80 minutes spent on the date of the encounter doing chart review, review of test results, interpretation of tests, patient visit, and documentation. The patient and his wife given the opportunity to ask multiple questions today, all of which were answered to their satisfaction.    Tan Hightower MD, PhD   of Medicine   Oncology/BMT/Cellular Therapies

## 2024-12-28 ENCOUNTER — HEALTH MAINTENANCE LETTER (OUTPATIENT)
Age: 74
End: 2024-12-28

## 2025-02-02 NOTE — PROGRESS NOTES
[facility] Medical Oncology Return Visit Note       Date of visit: February 3, 2025    CC:  3 month follow-up for mCPRC,     INTERVAL HISTORY:  Overall feels the same. No new pain.  No symptoms.  Appetite is good.  Energy is unchanged.  He has looked a bit more into trial options and Pluvicto.  Not sure how he would like to proceed.  Will think about how he would like to proceed after reviewing options today in clinic.     ONCOLOGY HISTORY:    #metastatic prostate cancer.    09/16/2023 Urology visit for lower urinary tract symptoms. Relatively low PSA in the past. 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   - Bladder US showed an enlarged prostate measuring 6.2 x 4.3 x 4.7 cm  - Cystoscopy was completed due to urinary retention. This showed severe trabeculation fo the baldder, he had enlarged prostate with an intravesical protion and lateral lobe enlargement. Discussed TURP procedure. Plan was to undergo a Urodynamic study.     11/16/2023   - PCP visit with Jarvis Gaxiola MD for fever. There was urinary retention and self cathing. Appeared septic with tachycardia, hypotension, and tachypnea. There is some loss of appetite. Referred to ED for admission.   - CT CAP showed no convincing evidence to confirm an etiology for fever in the chest, abdomen, or pelvis. Multiple small indistinct and infiltrative-appearing sclerotic areas throughout the spine, pelvis, and sternum. Metastatic disease cannot be excluded. Prostate-specific antigen elevation from 10/11/2023 (19.9 at that time) raises suspicion for the prostate as a source. Indeterminate density 2.6 cm structure in the right kidney. This could potentially represent a hemorrhagic/proteinaceous cyst, but a hypoenhancing solid mass cannot be excluded.    11/17/2023 Right iliac bone biopsy. Pathology showed metastatic adenocarcinoma, consistent with prostate primary.     12/05/2023 Pet/CT scan showed findings suspicious for left prostate gland  adenocarcinoma with widespread osseous metastases. No radiotracer uptake is identified within the biopsy-proven right papillary renal carcinoma.    01/23/2024 No BRACA 1 or 2 mutations. There was a + PODL1 mutation of unknown significance.     05/01/2024  - CT CAP showed no findings for new or progressive sites of metastatic disease. Increased density of diffuse sclerotic metastases may represent healing change.   - Bone scan showed widespread osteoblastic metastatic disease.     06/05/2024 Prostate chips, transurethral resection. Pathology showed prostatic adenocarcinoma, Grade Group 5, [place] grade 5+5 (Score 10). 7% tissue involvement.     11/05/2024  - CT CAP showed diffuse sclerotic metastasis throughout the visualized skeleton not significantly changed since the 5/1/2024 CT exam.  Postoperative changes from a TURP. Left-sided colonic diverticulosis without acute diverticulitis.  PSA 0.3    - Bone scan showed no significant interval change in diffuse tracer avid osseous metastases.     11/26/2024-PSMA PET with avidity of small lesions.  PSA 0.3    2/3/2025-PSA 1.4.  PSMA PET scan notable for increased number of bony lesions.  Considering [employer] [facility] clinical trial.  Pending response from trial team.  Pluvicto is clearly an option here.  We have additional trials in the pipeline, but timeline for opening is not yet clear.  Would also highly consider [id number] [facility] or [facility] as possible other options.  I will discuss with Dr. Rodriguez.     # R papillary renal cell carcinoma.    -11/28/2023: Right kidney needle core biopsy. Pathology showed renal cell carcinoma, favor papillary type, Grade 2  -5/1/24Stable right RCC.  -11/5/24-Interval decrease in size of a biopsy-proven renal cell carcinoma in the right kidney. Stable postoperative changes from a left nephrectomy.    GENOMIC STUDIES: NGS from prostate biopsy pending.  Germline testing negative for BRCA1/2.  PODL1 mutation of indeterminate significance.       TREATMENT HISTORY:     [facility]      GUIDELINES USED: NCCN    INTENT OF THERAPY: Palliative, discussed at 12/2/24 appointment.     CLINICAL TRIALS:  No available/open trials at initial visit.      PMH:  Papillary RCC, currently monitoring   Prostate cancer     PSH:  L nephrectomy as child for non-malignant etiology     ALLERGIES: Cats, seasonal     MEDS:  Current Outpatient Medications   Medication Instructions    albuterol (PROAIR HFA/PROVENTIL HFA/VENTOLIN HFA) 108 (90 Base) MCG/ACT inhaler 2 puffs    atorvastatin (LIPITOR) 40 MG tablet     Calcium Carbonate (CALCIUM 500 PO) Take  by mouth.    cycloSPORINE (RESTASIS) 0.05 % ophthalmic emulsion 1 drop, 2 TIMES DAILY    FISH OIL No dose, route, or frequency recorded.    fluticasone (FLONASE) 50 MCG/ACT nasal spray     Glucosamine-Chondroit-Vit C-Mn (GLUCOSAMINE 1500 COMPLEX) CAPS Take  by mouth.    loratadine (CLARITIN) 10 mg, DAILY    LORazepam (ATIVAN) 0.5-1 mg    Nubeqa 600 mg    omeprazole (PRILOSEC) 20 MG DR capsule     simvastatin (ZOCOR) 20 MG tablet AT BEDTIME       ROS-Remainder of 14 point ROS reviewed and negative except as in HPI.    PHYSICAL EXAM:  ECOG-PS=1    BP (!) 168/89 (BP Location: Right arm, Patient Position: Sitting, Cuff Size: Adult Regular)   Pulse 65   Temp 98.1  F (36.7  C) (Oral)   Resp 18   Wt 82.6 kg (182 lb 3.2 oz)   SpO2 97%   BMI 27.70 kg/m    Exam:  Constitutional: healthy, alert, and no distress  Head: Normocephalic.   Neck: Neck supple. No adenopathy grossly.   ENT: MMM  Cardiovascular: negative, No edema or JVD.  Respiratory: negative, normal WOB on RA   Gastrointestinal: Abdomen non-distended  : Deferred  Musculoskeletal: extremities normal- no gross deformities noted, gait normal, and normal muscle tone  Skin: no suspicious lesions or rashes on exposed areas of skin   Neurologic: negative, CNII-XII grossly intact, normal speech  Psychiatric: mentation appears normal and affect  normal/bright    LABS AND IMAGES:    PSMA PET from 11/25/24-Personally reviewed.  Multiple small foci of PSMA-avid prostate cancer noted.      PSA trend, see oncology history.      Labs 1/22/5  PSA 1.4  CBC WNL  CMP WNL with GFR >60mL/minute.  LFTs WNL.  Total protein 6.3, albumin 3.8.      IMPRESSION AND PLAN:    # Metastatic, hormone-resistant prostate cancer.    # prostate cancer metastatic to bone    Jd Restrepo is a 74 year old male referred for second opinion by Dr. Dave Rodriguez.  He was initially diagnosed in the fall of 2023 after presenting with lower urinary tract symptoms.  PSA was ~19 and bone metastases were present and biopsied, confirming prostate adenocarcinoma.  He was started on ADT/docetaxel/darolutamide which he tolerated well and continued with darolutamide.  PSA nicol was undetectable while on darolutamide, but began to rise in the fall of 2024.  Prostate chips from the summer of 2024 were consistent with [place] 10.  PSA was 0.3 in November 2024 and 0.6 in December 2024.  PSMA PET was completed at the end of November 2024 with multiple small metastases with PSMA avidity.  Conventional scans were stable without evidence for new areas of disease.  He was otherwise feeling well at our first appointment and continued to tolerate darolutamide without issues.      Jd was very interested in immune therapy or other clinical trials along with the possibility of Pluvicto.  His prior diagnosis of papillary renal cell carcinoma complicates clinical trial enrollment.  At the moment, we do not have any immune therapies open in the [facility] setting.  We have two in the pipeline that will hopefully open in the next few months.  LAVA-1207 explicitly excludes any patient with prior malignancy; however, as worded currently the MBX566 Phase III of xaluritamig versus cabazitaxel or ARSI switch may allow this.  This is dependent upon the final protocol and sponsor approval as long as the concurrent malignancy  does not interfere with response assessment or lifespan.  I will keep on eye on this for Jd as a possibility, but there are no guarantees.      Regarding Pluvicto, Jd is clearly eligible with prior taxane and ARSI in the hormone sensitive setting.  We reviewed the isolation requirements and side effects of Pluvicto along with dosing scheduled of every 6 weeks for 6 doses.  He has no issues with complying with the isolation requirements and was interested in the possibility of Pluvicto.  I also discussed the side effects of dry mouth/dry eye, which can significantly worsen with Pluvicto, particularly in the context of low volume disease.  We reviewed his scans and showed the other areas of physiologic uptake in the salivary and lacrimal glands in particular, which were significantly larger volume than his current PSMA avid metastases.  At our initial appointment in December 2024, my recommendation was to watch and wait since he is feeling well and his PSA is low and there is no evidence for progression on conventional imaging.  My concern was the significant and permanent dry mouth/dry eye with concentration of Pluvicto in the salivary and lacrimal glands.  We could certainly treat now as well if he feels particularly inclined to do so, but we agreed that holding off at the moment was the best option.      On his repeat evaluation 1/22/2025, there was clear progression on his PSMA PET scan.  We discussed Pluvicto versus standard of care options versus clinical trials.  Fortunately, Jd has many good options.  Pluvicto would be covered with the risk of dry mouth, but could be effective.  He could also enroll on Dr. Rodriguez's study of [id number] that has a Zytiga control arm.  I would be less likely to consider cabazitaxel at this time given the other options and low volume, but technically this is possible.  Given his multiple other options, we typically don't recommnd Provenge.      PLAN:     Think about how you  would like to proceed and let us know.   I will reach out to Dr. Rodriguez to discuss our thoughts.    We'll let you know if we hear anything back from [facility] regarding Syncromune.    Schedule with me TBD based on your choice.     A total of 65 minutes spent on the date of the encounter doing chart review, review of test results, interpretation of tests, patient visit, and documentation. The patient and his wife given the opportunity to ask multiple questions today, all of which were answered to their satisfaction.  Extended service time for extensive discussion of treatment options.     The longitudinal plan of care for metastatic castration-resistant prostate cancer was addressed during this visit. Due to the added complexity in care, I will continue to support Jd Restrepo in the subsequent management of this condition(s) and with the ongoing continuity of care of this condition(s).     Tan Hightower MD, PhD   of Medicine   Oncology/BMT/Cellular Therapies

## 2025-02-03 ENCOUNTER — ONCOLOGY VISIT (OUTPATIENT)
Dept: ONCOLOGY | Facility: CLINIC | Age: 75
End: 2025-02-03
Attending: STUDENT IN AN ORGANIZED HEALTH CARE EDUCATION/TRAINING PROGRAM
Payer: COMMERCIAL

## 2025-02-03 VITALS
BODY MASS INDEX: 27.7 KG/M2 | SYSTOLIC BLOOD PRESSURE: 168 MMHG | OXYGEN SATURATION: 97 % | RESPIRATION RATE: 18 BRPM | HEART RATE: 65 BPM | DIASTOLIC BLOOD PRESSURE: 89 MMHG | WEIGHT: 182.2 LBS | TEMPERATURE: 98.1 F

## 2025-02-03 DIAGNOSIS — C61 METASTATIC CASTRATION-RESISTANT ADENOCARCINOMA OF PROSTATE (H): ICD-10-CM

## 2025-02-03 DIAGNOSIS — Z19.2 METASTATIC CASTRATION-RESISTANT ADENOCARCINOMA OF PROSTATE (H): ICD-10-CM

## 2025-02-03 DIAGNOSIS — C79.51 PROSTATE CANCER METASTATIC TO BONE (H): ICD-10-CM

## 2025-02-03 DIAGNOSIS — C61 MALIGNANT NEOPLASM OF PROSTATE (H): Primary | ICD-10-CM

## 2025-02-03 DIAGNOSIS — C61 PROSTATE CANCER METASTATIC TO BONE (H): ICD-10-CM

## 2025-02-03 PROCEDURE — G2211 COMPLEX E/M VISIT ADD ON: HCPCS | Performed by: STUDENT IN AN ORGANIZED HEALTH CARE EDUCATION/TRAINING PROGRAM

## 2025-02-03 PROCEDURE — G0463 HOSPITAL OUTPT CLINIC VISIT: HCPCS | Performed by: STUDENT IN AN ORGANIZED HEALTH CARE EDUCATION/TRAINING PROGRAM

## 2025-02-03 PROCEDURE — 99215 OFFICE O/P EST HI 40 MIN: CPT | Performed by: STUDENT IN AN ORGANIZED HEALTH CARE EDUCATION/TRAINING PROGRAM

## 2025-02-03 ASSESSMENT — PAIN SCALES - GENERAL: PAINLEVEL_OUTOF10: NO PAIN (0)

## 2025-02-03 NOTE — NURSING NOTE
"Oncology Rooming Note    February 3, 2025 10:54 AM   Jd Restrepo is a 74 year old male who presents for:    Chief Complaint   Patient presents with    Oncology Clinic Visit     Malignant neoplasm of prostate     Initial Vitals: BP (!) 168/89 (BP Location: Right arm, Patient Position: Sitting, Cuff Size: Adult Regular)   Pulse 65   Temp 98.1  F (36.7  C) (Oral)   Resp 18   Wt 82.6 kg (182 lb 3.2 oz)   SpO2 97%   BMI 27.70 kg/m   Estimated body mass index is 27.7 kg/m  as calculated from the following:    Height as of 12/2/24: 1.727 m (5' 8\").    Weight as of this encounter: 82.6 kg (182 lb 3.2 oz). Body surface area is 1.99 meters squared.  No Pain (0) Comment: Data Unavailable   No LMP for male patient.  Allergies reviewed: Yes  Medications reviewed: Yes    Medications: Medication refills not needed today.  Pharmacy name entered into [employer]: [id number] [place] [address]    Frailty Screening:   Is the patient here for a new oncology consult visit in cancer care? 2. No      Clinical concerns: none.       Sol Adkins"

## 2025-02-03 NOTE — LETTER
2/3/2025      Jd Restrepo  [address]  [place] [address]      Dear Colleague,    Thank you for referring your patient, Jd Restrepo, to the [facility]. Please see a copy of my visit note below.      [facility] Medical Oncology Return Visit Note       Date of visit: February 3, 2025    CC:  3 month follow-up for mCPRC,     INTERVAL HISTORY:  Overall feels the same. No new pain.  No symptoms.  Appetite is good.  Energy is unchanged.  He has looked a bit more into trial options and Pluvicto.  Not sure how he would like to proceed.  Will think about how he would like to proceed after reviewing options today in clinic.     ONCOLOGY HISTORY:    #metastatic prostate cancer.    09/16/2023 Urology visit for lower urinary tract symptoms. Relatively low PSA in the past. 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   - Bladder US showed an enlarged prostate measuring 6.2 x 4.3 x 4.7 cm  - Cystoscopy was completed due to urinary retention. This showed severe trabeculation fo the baldder, he had enlarged prostate with an intravesical protion and lateral lobe enlargement. Discussed TURP procedure. Plan was to undergo a Urodynamic study.     11/16/2023   - PCP visit with Jarvis Gaxiola MD for fever. There was urinary retention and self cathing. Appeared septic with tachycardia, hypotension, and tachypnea. There is some loss of appetite. Referred to ED for admission.   - CT CAP showed no convincing evidence to confirm an etiology for fever in the chest, abdomen, or pelvis. Multiple small indistinct and infiltrative-appearing sclerotic areas throughout the spine, pelvis, and sternum. Metastatic disease cannot be excluded. Prostate-specific antigen elevation from 10/11/2023 (19.9 at that time) raises suspicion for the prostate as a source. Indeterminate density 2.6 cm structure in the right kidney. This could potentially represent a hemorrhagic/proteinaceous cyst, but a  hypoenhancing solid mass cannot be excluded.    11/17/2023 Right iliac bone biopsy. Pathology showed metastatic adenocarcinoma, consistent with prostate primary.     12/05/2023 Pet/CT scan showed findings suspicious for left prostate gland adenocarcinoma with widespread osseous metastases. No radiotracer uptake is identified within the biopsy-proven right papillary renal carcinoma.    01/23/2024 No BRACA 1 or 2 mutations. There was a + PODL1 mutation of unknown significance.     05/01/2024  - CT CAP showed no findings for new or progressive sites of metastatic disease. Increased density of diffuse sclerotic metastases may represent healing change.   - Bone scan showed widespread osteoblastic metastatic disease.     06/05/2024 Prostate chips, transurethral resection. Pathology showed prostatic adenocarcinoma, Grade Group 5, Ariela grade 5+5 (Score 10). 7% tissue involvement.     11/05/2024  - CT CAP showed diffuse sclerotic metastasis throughout the visualized skeleton not significantly changed since the 5/1/2024 CT exam.  Postoperative changes from a TURP. Left-sided colonic diverticulosis without acute diverticulitis.  PSA 0.3    - Bone scan showed no significant interval change in diffuse tracer avid osseous metastases.     11/26/2024-PSMA PET with avidity of small lesions.  PSA 0.3    2/3/2025-PSA 1.4.  PSMA PET scan notable for increased number of bony lesions.  [facility].  Pending response from trial team.  Pluvicto is clearly an option here.  We have additional trials in the pipeline, but timeline for opening is not yet clear.  Would also highly consider [id number] at [facility] or [facility] as possible other options.  I will discuss with Dr. Rodriguez.     # R papillary renal cell carcinoma.    -11/28/2023: Right kidney needle core biopsy. Pathology showed renal cell carcinoma, favor papillary type, Grade 2  -5/1/24Stable right RCC.  -11/5/24-Interval decrease in size of a biopsy-proven  renal cell carcinoma in the right kidney. Stable postoperative changes from a left nephrectomy.    GENOMIC STUDIES: NGS from prostate biopsy pending.  Germline testing negative for BRCA1/2.  PODL1 mutation of indeterminate significance.      TREATMENT HISTORY:     [facility]      GUIDELINES USED: NCCN    INTENT OF THERAPY: Palliative, discussed at 12/2/24 appointment.     CLINICAL TRIALS:  No available/open trials at initial visit.      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     cycloSPORINE (RESTASIS) 0.05 % ophthalmic emulsion 1 drop, 2 TIMES DAILY     FISH OIL No dose, route, or frequency recorded.     fluticasone (FLONASE) 50 MCG/ACT nasal spray      Glucosamine-Chondroit-Vit C-Mn (GLUCOSAMINE 1500 COMPLEX) CAPS Take  by mouth.     loratadine (CLARITIN) 10 mg, DAILY     LORazepam (ATIVAN) 0.5-1 mg     Nubeqa 600 mg     omeprazole (PRILOSEC) 20 MG DR capsule      simvastatin (ZOCOR) 20 MG tablet AT BEDTIME       ROS-Remainder of 14 point ROS reviewed and negative except as in HPI.    PHYSICAL EXAM:  ECOG-PS=1    BP (!) 168/89 (BP Location: Right arm, Patient Position: Sitting, Cuff Size: Adult Regular)   Pulse 65   Temp 98.1  F (36.7  C) (Oral)   Resp 18   Wt 82.6 kg (182 lb 3.2 oz)   SpO2 97%   BMI 27.70 kg/m    Exam:  Constitutional: healthy, alert, and no distress  Head: Normocephalic.   Neck: Neck supple. No adenopathy grossly.   ENT: MMM  Cardiovascular: negative, No edema or JVD.  Respiratory: negative, normal WOB on RA   Gastrointestinal: Abdomen non-distended  : Deferred  Musculoskeletal: extremities  normal- no gross deformities noted, gait normal, and normal muscle tone  Skin: no suspicious lesions or rashes on exposed areas of skin   Neurologic: negative, CNII-XII grossly intact, normal speech  Psychiatric: mentation appears normal and affect normal/bright    LABS AND IMAGES:    PSMA PET from 11/25/24-Personally reviewed.  Multiple small foci of PSMA-avid prostate cancer noted.      PSA trend, see oncology history.      Labs 1/22/5  PSA 1.4  CBC WNL  CMP WNL with GFR >60mL/minute.  LFTs WNL.  Total protein 6.3, albumin 3.8.      IMPRESSION AND PLAN:    # Metastatic, hormone-resistant prostate cancer.    # prostate cancer metastatic to bone    Jd Restrepo is a 74 year old male referred for second opinion by Dr. Dave Rodriguez.  He was initially diagnosed in the fall of 2023 after presenting with lower urinary tract symptoms.  PSA was ~19 and bone metastases were present and biopsied, confirming prostate adenocarcinoma.  He was started on ADT/docetaxel/darolutamide which he tolerated well and continued with darolutamide.  PSA nicol was undetectable while on darolutamide, but began to rise in the fall of 2024.  Prostate chips from the summer of 2024 were consistent with Ariela 10.  PSA was 0.3 in November 2024 and 0.6 in December 2024.  PSMA PET was completed at the end of November 2024 with multiple small metastases with PSMA avidity.  Conventional scans were stable without evidence for new areas of disease.  He was otherwise feeling well at our first appointment and continued to tolerate darolutamide without issues.      Jd was very interested in immune therapy or other clinical trials along with the possibility of Pluvicto.  His prior diagnosis of papillary renal cell carcinoma complicates clinical trial enrollment.  At the moment, we do not have any immune therapies open in the [facility] setting.  We have two in the pipeline that will hopefully open in the next few months.  LAVA-1207 explicitly excludes any  patient with prior malignancy; however, as worded currently the KPS531 Phase III of xaluritamig versus cabazitaxel or ARSI switch may allow this.  This is dependent upon the final protocol and sponsor approval as long as the concurrent malignancy does not interfere with response assessment or lifespan.  I will keep on eye on this for Jd as a possibility, but there are no guarantees.      Regarding Pluvicto, Jd is clearly eligible with prior taxane and ARSI in the hormone sensitive setting.  We reviewed the isolation requirements and side effects of Pluvicto along with dosing scheduled of every 6 weeks for 6 doses.  He has no issues with complying with the isolation requirements and was interested in the possibility of Pluvicto.  I also discussed the side effects of dry mouth/dry eye, which can significantly worsen with Pluvicto, particularly in the context of low volume disease.  We reviewed his scans and showed the other areas of physiologic uptake in the salivary and lacrimal glands in particular, which were significantly larger volume than his current PSMA avid metastases.  At our initial appointment in December 2024, my recommendation was to watch and wait since he is feeling well and his PSA is low and there is no evidence for progression on conventional imaging.  My concern was the significant and permanent dry mouth/dry eye with concentration of Pluvicto in the salivary and lacrimal glands.  We could certainly treat now as well if he feels particularly inclined to do so, but we agreed that holding off at the moment was the best option.      On his repeat evaluation 1/22/2025, there was clear progression on his PSMA PET scan.  We discussed Pluvicto versus standard of care options versus clinical trials.  Fortunately, Jd has many good options.  Pluvicto would be covered with the risk of dry mouth, but could be effective.  He could also enroll on Dr. Rodriguez's study of [id number] that has a Zytiga control  arm.  I would be less likely to consider cabazitaxel at this time given the other options and low volume, but technically this is possible.  Given his multiple other options, we typically don't recommnd Provenge.      PLAN:     Think about how you would like to proceed and let us know.   I will reach out to Dr. Rodriguez to discuss our thoughts.    We'll let you know if we hear anything back from [facility] regarding Syncromune.    Schedule with me TBD based on your choice.     A total of 65 minutes spent on the date of the encounter doing chart review, review of test results, interpretation of tests, patient visit, and documentation. The patient and his wife given the opportunity to ask multiple questions today, all of which were answered to their satisfaction.  Extended service time for extensive discussion of treatment options.     The longitudinal plan of care for metastatic castration-resistant prostate cancer was addressed during this visit. Due to the added complexity in care, I will continue to support Jd KEESHA Restrepo in the subsequent management of this condition(s) and with the ongoing continuity of care of this condition(s).     Tan Hightower MD, PhD   of Medicine   Oncology/BMT/Cellular Therapies      Again, thank you for allowing me to participate in the care of your patient.        Sincerely,        Tan Hightower MD    Electronically signed

## 2025-02-03 NOTE — PATIENT INSTRUCTIONS
Jd,   We went over a lot today.  The big thing to remember is that you have plenty of options to choose from, all of which are appropriate for you.      Think about how you would like to proceed and let us know.   I will reach out to Dr. Rodriguez to discuss our thoughts.    We'll let you know if we hear anything back from [facility] regarding Syncromune.    Schedule with me TBD based on your choice.

## 2025-02-12 DIAGNOSIS — Z19.2 METASTATIC CASTRATION-RESISTANT ADENOCARCINOMA OF PROSTATE (H): Primary | ICD-10-CM

## 2025-02-12 DIAGNOSIS — C61 METASTATIC CASTRATION-RESISTANT ADENOCARCINOMA OF PROSTATE (H): Primary | ICD-10-CM

## 2025-02-12 RX ORDER — MEPERIDINE HYDROCHLORIDE 25 MG/ML
25 INJECTION INTRAMUSCULAR; INTRAVENOUS; SUBCUTANEOUS
OUTPATIENT
Start: 2025-02-24

## 2025-02-12 RX ORDER — ONDANSETRON 4 MG/1
8 TABLET, FILM COATED ORAL
OUTPATIENT
Start: 2025-02-24

## 2025-02-12 RX ORDER — PROCHLORPERAZINE MALEATE 5 MG/1
5 TABLET ORAL EVERY 6 HOURS PRN
Start: 2025-02-24

## 2025-02-12 RX ORDER — LORAZEPAM 0.5 MG/1
.5-1 TABLET ORAL EVERY 6 HOURS PRN
Start: 2025-02-24

## 2025-02-12 RX ORDER — EPINEPHRINE 1 MG/ML
0.3 INJECTION, SOLUTION, CONCENTRATE INTRAVENOUS EVERY 5 MIN PRN
OUTPATIENT
Start: 2025-02-24

## 2025-02-12 RX ORDER — ALBUTEROL SULFATE 90 UG/1
1-2 INHALANT RESPIRATORY (INHALATION)
Start: 2025-02-24

## 2025-02-12 RX ORDER — LORAZEPAM 2 MG/ML
.5-1 INJECTION INTRAMUSCULAR EVERY 6 HOURS PRN
OUTPATIENT
Start: 2025-02-24

## 2025-02-12 RX ORDER — DIPHENHYDRAMINE HYDROCHLORIDE 50 MG/ML
25 INJECTION INTRAMUSCULAR; INTRAVENOUS
Start: 2025-02-24

## 2025-02-12 RX ORDER — DIPHENHYDRAMINE HYDROCHLORIDE 50 MG/ML
50 INJECTION INTRAMUSCULAR; INTRAVENOUS
Start: 2025-02-24

## 2025-02-12 RX ORDER — ALBUTEROL SULFATE 0.83 MG/ML
2.5 SOLUTION RESPIRATORY (INHALATION)
OUTPATIENT
Start: 2025-02-24

## 2025-02-13 DIAGNOSIS — C79.51 METASTASIS TO BONE (H): Primary | ICD-10-CM

## 2025-02-17 ENCOUNTER — PATIENT OUTREACH (OUTPATIENT)
Dept: ONCOLOGY | Facility: CLINIC | Age: 75
End: 2025-02-17
Payer: COMMERCIAL

## 2025-02-17 ENCOUNTER — TELEPHONE (OUTPATIENT)
Dept: NUCLEAR MEDICINE | Facility: CLINIC | Age: 75
End: 2025-02-17
Payer: COMMERCIAL

## 2025-02-17 NOTE — PROGRESS NOTES
[facility]: Cancer Care                                                                                        Patient would like his lads drawn at [facility]. RN called and spoke to Dr. Lane Care Coordinator. They will put orders in for labs on 2/19. Patient understands he needs to call for appt or walk in lab. [facility] requested that we send a fax when labs are due.  Patient is going to [place] and will be returning on 4/7. This only gives 1 week labs prior to next treatment. RN will be talking to Dr. Hightower for guidance.       Perla FARMERN, RN, OCN  Care Coordinator  [facility]

## 2025-02-24 DIAGNOSIS — C61 METASTATIC CASTRATION-RESISTANT ADENOCARCINOMA OF PROSTATE (H): Primary | ICD-10-CM

## 2025-02-24 DIAGNOSIS — Z19.2 METASTATIC CASTRATION-RESISTANT ADENOCARCINOMA OF PROSTATE (H): Primary | ICD-10-CM

## 2025-03-03 ENCOUNTER — PATIENT OUTREACH (OUTPATIENT)
Dept: ONCOLOGY | Facility: CLINIC | Age: 75
End: 2025-03-03
Payer: COMMERCIAL

## 2025-03-03 NOTE — PROGRESS NOTES
[facility]: Cancer Care                                                                                        Completed chart audit to assign Oncology Care Coordination enrollment status.        Perla FARMERN, RN, OCN  Care Coordinator  [facility]

## 2025-03-05 ENCOUNTER — HOSPITAL ENCOUNTER (OUTPATIENT)
Dept: NUCLEAR MEDICINE | Facility: CLINIC | Age: 75
Setting detail: NUCLEAR MEDICINE
Discharge: HOME | End: 2025-03-05
Attending: STUDENT IN AN ORGANIZED HEALTH CARE EDUCATION/TRAINING PROGRAM
Payer: COMMERCIAL

## 2025-03-05 VITALS
OXYGEN SATURATION: 99 % | DIASTOLIC BLOOD PRESSURE: 87 MMHG | RESPIRATION RATE: 16 BRPM | SYSTOLIC BLOOD PRESSURE: 151 MMHG | HEART RATE: 73 BPM

## 2025-03-05 DIAGNOSIS — C61 METASTATIC CASTRATION-RESISTANT ADENOCARCINOMA OF PROSTATE (H): ICD-10-CM

## 2025-03-05 DIAGNOSIS — C79.51 METASTASIS TO BONE (H): Primary | ICD-10-CM

## 2025-03-05 DIAGNOSIS — Z19.2 METASTATIC CASTRATION-RESISTANT ADENOCARCINOMA OF PROSTATE (H): ICD-10-CM

## 2025-03-05 LAB — RADIOLOGIST FLAGS: ABNORMAL

## 2025-03-05 PROCEDURE — 79101 NUCLEAR RX IV ADMIN: CPT | Mod: 26 | Performed by: RADIOLOGY

## 2025-03-05 PROCEDURE — A9607 HC RX 344 MED OP 636: HCPCS | Performed by: STUDENT IN AN ORGANIZED HEALTH CARE EDUCATION/TRAINING PROGRAM

## 2025-03-05 PROCEDURE — 999N000128 HC STATISTIC PERIPHERAL IV START W/O US GUIDANCE

## 2025-03-05 PROCEDURE — 344N000001 HC RX 344 MED OP 636: Performed by: STUDENT IN AN ORGANIZED HEALTH CARE EDUCATION/TRAINING PROGRAM

## 2025-03-05 PROCEDURE — 79101 NUCLEAR RX IV ADMIN: CPT

## 2025-03-05 RX ORDER — EPINEPHRINE 1 MG/ML
0.3 INJECTION, SOLUTION, CONCENTRATE INTRAVENOUS EVERY 5 MIN PRN
Status: DISCONTINUED | OUTPATIENT
Start: 2025-03-05 | End: 2025-03-06 | Stop reason: HOSPADM

## 2025-03-05 RX ORDER — ALBUTEROL SULFATE 0.83 MG/ML
2.5 SOLUTION RESPIRATORY (INHALATION)
Status: DISCONTINUED | OUTPATIENT
Start: 2025-03-05 | End: 2025-03-06 | Stop reason: HOSPADM

## 2025-03-05 RX ORDER — LORAZEPAM 0.5 MG/1
.5-1 TABLET ORAL EVERY 6 HOURS PRN
Status: DISCONTINUED | OUTPATIENT
Start: 2025-03-05 | End: 2025-03-06 | Stop reason: HOSPADM

## 2025-03-05 RX ORDER — DIPHENHYDRAMINE HYDROCHLORIDE 50 MG/ML
50 INJECTION, SOLUTION INTRAMUSCULAR; INTRAVENOUS
Status: DISCONTINUED | OUTPATIENT
Start: 2025-03-05 | End: 2025-03-06 | Stop reason: HOSPADM

## 2025-03-05 RX ORDER — METHYLPREDNISOLONE SODIUM SUCCINATE 40 MG/ML
40 INJECTION INTRAMUSCULAR; INTRAVENOUS
Status: DISCONTINUED | OUTPATIENT
Start: 2025-03-05 | End: 2025-03-06 | Stop reason: HOSPADM

## 2025-03-05 RX ORDER — ALBUTEROL SULFATE 90 UG/1
1-2 INHALANT RESPIRATORY (INHALATION)
Status: DISCONTINUED | OUTPATIENT
Start: 2025-03-05 | End: 2025-03-06 | Stop reason: HOSPADM

## 2025-03-05 RX ORDER — ONDANSETRON 8 MG/1
8 TABLET, FILM COATED ORAL
Status: DISCONTINUED | OUTPATIENT
Start: 2025-03-05 | End: 2025-03-06 | Stop reason: HOSPADM

## 2025-03-05 RX ORDER — DIPHENHYDRAMINE HYDROCHLORIDE 50 MG/ML
25 INJECTION, SOLUTION INTRAMUSCULAR; INTRAVENOUS
Status: DISCONTINUED | OUTPATIENT
Start: 2025-03-05 | End: 2025-03-06 | Stop reason: HOSPADM

## 2025-03-05 RX ORDER — LORAZEPAM 2 MG/ML
.5-1 INJECTION INTRAMUSCULAR EVERY 6 HOURS PRN
Status: DISCONTINUED | OUTPATIENT
Start: 2025-03-05 | End: 2025-03-06 | Stop reason: HOSPADM

## 2025-03-05 RX ORDER — PROCHLORPERAZINE MALEATE 5 MG/1
5 TABLET ORAL EVERY 6 HOURS PRN
Status: DISCONTINUED | OUTPATIENT
Start: 2025-03-05 | End: 2025-03-06 | Stop reason: HOSPADM

## 2025-03-05 RX ORDER — MEPERIDINE HYDROCHLORIDE 25 MG/ML
25 INJECTION INTRAMUSCULAR; INTRAVENOUS; SUBCUTANEOUS
Status: DISCONTINUED | OUTPATIENT
Start: 2025-03-05 | End: 2025-03-06 | Stop reason: HOSPADM

## 2025-03-05 RX ADMIN — LUTETIUM LU 177 VIPIVOTIDE TETRAXETAN 200 MILLICURIE: 27 INJECTION, SOLUTION INTRAVENOUS at 10:56

## 2025-03-05 NOTE — PROGRESS NOTES
[facility] Nursing Note:    Patient presents today for Pluvicto therapy, dose 1 of  6  Patient seen by provider today: Yes: Dr Sanders       Intravenous Access:  Peripheral IV placed by Vascular Access     Treatment Conditions:  Labs done on  02/19/25    Results reviewed, labs Met treatment parameters, ok to proceed with treatment.           Post Infusion Assessment:  Patient tolerated infusion without incident.    PIV - No evidence of extravasations, access discontinued per protocol.         Discharge Plan:   Patient will return as scheduled for next appointment.   Patient discharged at 1126a in stable condition accompanied by: His wife  Departure Mode: Ambulatory

## 2025-03-05 NOTE — PROGRESS NOTES
Reviewed with pt and he prefers referral through [facility].      Discussed with his primary oncologist at [facility], Dr. Rodriguez, who has made a connection/referral to ortho at the [facility].

## 2025-03-19 ENCOUNTER — DOCUMENTATION ONLY (OUTPATIENT)
Dept: GERIATRICS | Facility: CLINIC | Age: 75
End: 2025-03-19
Payer: COMMERCIAL

## 2025-04-03 ENCOUNTER — VIRTUAL VISIT (OUTPATIENT)
Dept: ONCOLOGY | Facility: CLINIC | Age: 75
End: 2025-04-03
Attending: STUDENT IN AN ORGANIZED HEALTH CARE EDUCATION/TRAINING PROGRAM
Payer: COMMERCIAL

## 2025-04-03 VITALS
DIASTOLIC BLOOD PRESSURE: 70 MMHG | BODY MASS INDEX: 26.52 KG/M2 | HEIGHT: 68 IN | WEIGHT: 175 LBS | SYSTOLIC BLOOD PRESSURE: 115 MMHG

## 2025-04-03 DIAGNOSIS — Z19.2 METASTATIC CASTRATION-RESISTANT ADENOCARCINOMA OF PROSTATE (H): Primary | ICD-10-CM

## 2025-04-03 DIAGNOSIS — C61 METASTATIC CASTRATION-RESISTANT ADENOCARCINOMA OF PROSTATE (H): Primary | ICD-10-CM

## 2025-04-03 DIAGNOSIS — C79.51 METASTASIS TO BONE (H): ICD-10-CM

## 2025-04-03 RX ORDER — ENOXAPARIN SODIUM 100 MG/ML
40 INJECTION SUBCUTANEOUS
COMMUNITY
Start: 2025-03-19 | End: 2025-05-01

## 2025-04-03 NOTE — LETTER
4/3/2025      Jd Restrepo  [address]  [place] [address]      Dear Colleague,    Thank you for referring your patient, Jd Restrepo, to the [facility]. Please see a copy of my visit note below.      [facility] Medical Oncology Return Visit Note       Date of visit: Apr 3, 2025    CC:  follow-up for mCPRC, prior cycle 2 pluvicto    INTERVAL HISTORY: Jd is seen virtually today. He had a hip replacement on 3/17/25. He is home. He is getting PT at home and is working on improving mobility. He is still using a walker but is feeling he could transition to a cane. PT is visiting him twice a week and then OT 1 time a week. Next week will be his final home PT. He has a follow up with orthopedics for check up. Pain in minimal in the right hip. He is taking tylenol as needed. He is continuing on lovenox per the direction of her surgeon. He tolerated the first cycle of pluvicto okay but did have 3-4 days of fatigue with mild constipation which he took 1 ducolax tablet. No nausea or vomiting. He has dry mouth especially in the night. No other pain. No chest pain, shortness of breath, or cough. No fevers or chills.     ONCOLOGY HISTORY:    #metastatic prostate cancer.    09/16/2023 Urology visit for lower urinary tract symptoms. Relatively low PSA in the past. 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   - Bladder US showed an enlarged prostate measuring 6.2 x 4.3 x 4.7 cm  - Cystoscopy was completed due to urinary retention. This showed severe trabeculation fo the baldder, he had enlarged prostate with an intravesical protion and lateral lobe enlargement. Discussed TURP procedure. Plan was to undergo a Urodynamic study.     11/16/2023   - PCP visit with Jarvis Gaxiola MD for fever. There was urinary retention and self cathing. Appeared septic with tachycardia, hypotension, and tachypnea. There is some loss of appetite. Referred to ED for admission.   - CT  CAP showed no convincing evidence to confirm an etiology for fever in the chest, abdomen, or pelvis. Multiple small indistinct and infiltrative-appearing sclerotic areas throughout the spine, pelvis, and sternum. Metastatic disease cannot be excluded. Prostate-specific antigen elevation from 10/11/2023 (19.9 at that time) raises suspicion for the prostate as a source. Indeterminate density 2.6 cm structure in the right kidney. This could potentially represent a hemorrhagic/proteinaceous cyst, but a hypoenhancing solid mass cannot be excluded.    11/17/2023 Right iliac bone biopsy. Pathology showed metastatic adenocarcinoma, consistent with prostate primary.     12/05/2023 Pet/CT scan showed findings suspicious for left prostate gland adenocarcinoma with widespread osseous metastases. No radiotracer uptake is identified within the biopsy-proven right papillary renal carcinoma.    01/23/2024 No BRACA 1 or 2 mutations. There was a + PODL1 mutation of unknown significance.     05/01/2024  - CT CAP showed no findings for new or progressive sites of metastatic disease. Increased density of diffuse sclerotic metastases may represent healing change.   - Bone scan showed widespread osteoblastic metastatic disease.     06/05/2024 Prostate chips, transurethral resection. Pathology showed prostatic adenocarcinoma, Grade Group 5, Ariela grade 5+5 (Score 10). 7% tissue involvement.     11/05/2024  - CT CAP showed diffuse sclerotic metastasis throughout the visualized skeleton not significantly changed since the 5/1/2024 CT exam.  Postoperative changes from a TURP. Left-sided colonic diverticulosis without acute diverticulitis.  PSA 0.3    - Bone scan showed no significant interval change in diffuse tracer avid osseous metastases.     11/26/2024-PSMA PET with avidity of small lesions.  PSA 0.3    2/3/2025-PSA 1.4.  PSMA PET scan notable for increased number of bony lesions.  Considering [employer] clinical trial.  Pending  response from trial team.  Pluvicto is clearly an option here.  We have additional trials in the pipeline, but timeline for opening is not yet clear.  Would also highly consider [id number] at [facility] or [facility] as possible other options.      2/19/25 PSA 2.9- opted to proceed with pluvicto. Cycle 1 on 3/5/25. Concern for impending right hip fracture seen on 3/5/25 pluvicto imaging and underwent an right hip replacement on 03/17/2025 at [facility].     3/31/25 PSA 4.0- cycle 2 pluvicto on 4/16/25.     # R papillary renal cell carcinoma.    -11/28/2023: Right kidney needle core biopsy. Pathology showed renal cell carcinoma, favor papillary type, Grade 2  -5/1/24Stable right RCC.  -11/5/24-Interval decrease in size of a biopsy-proven renal cell carcinoma in the right kidney. Stable postoperative changes from a left nephrectomy.    GENOMIC STUDIES: NGS from prostate biopsy pending.  Germline testing negative for BRCA1/2.  PODL1 mutation of indeterminate significance.      TREATMENT HISTORY:     ADT  Docetaxel/darolutamide in [facility] setting.      GUIDELINES USED: NCCN    INTENT OF THERAPY: Palliative, discussed at 12/2/24 appointment.     CLINICAL TRIALS:  No available/open trials at initial visit.      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     cycloSPORINE (RESTASIS) 0.05 % ophthalmic emulsion 1 drop, 2 TIMES DAILY     FISH OIL No dose, route, or frequency recorded.     fluticasone (FLONASE) 50 MCG/ACT nasal spray      Glucosamine-Chondroit-Vit C-Mn (GLUCOSAMINE 1500 COMPLEX) CAPS Take  by mouth.     loratadine (CLARITIN) 10 mg, DAILY     LORazepam (ATIVAN)  0.5-1 mg     Nubeqa 600 mg     omeprazole (PRILOSEC) 20 MG DR capsule      simvastatin (ZOCOR) 20 MG tablet AT BEDTIME       ROS-Remainder of 14 point ROS reviewed and negative except as in HPI.    PHYSICAL EXAM:  ECOG-PS=1  Video physical exam  General: Patient appears well in no acute distress.   Skin: No visualized rash or lesions on visualized skin  Eyes: EOMI, no erythema, sclera icterus or discharge noted  Resp: Appears to be breathing comfortably without accessory muscle usage, speaking in full sentences, no cough  MSK: Appears to have normal range of motion based on visualized movements  Neurologic: No apparent tremors, facial movements symmetric  Psych: affect bright, alert and oriented    LABS AND IMAGES:    PSMA PET from 11/25/24-Personally reviewed.  Multiple small foci of PSMA-avid prostate cancer noted.      PSA trend, see oncology history.        Recent Data from [email or web address]  Related to Comp Metabolic Panel - standing every 4 weeks  Component 03/31/25 02/19/25 01/22/25 12/03/24 11/05/24 08/19/24   Sodium 143 145 139 144 143 144   Potassium 5.1 4.6 4.1 4.7 4.4 5.0   Chloride 108 109 109 109 108 108   CO2 26 26 23 27 26 27   Anion Gap 9 10 7 8 9 9   Calcium 9.6 9.2 9.0 9.9 9.7 10.5 High     BUN 12 11 15 14 16 16   Creatinine 0.7 Low  0.69 Low  0.70 Low  0.77 0.72 Low  0.78   Alkaline Phosphatase 110 96 69 82 75 69   AST (SGOT) 19 29 30 20 27 24   ALT (SGPT) 20 30 28 24 37 30   Bilirubin, Total 0.5 0.6 0.7 1.0 0.8 1.2   Protein, Total 6.7 6.8 6.3 Low  6.5 6.4 6.1 Low    Albumin 3.6 4 3.8 3.8 3.9 3.6   Glucose 149 High   108 High   111 High   104 High   101 High   100    GFR, Estimated >60 >60 >60 >60 >60 >60   Hours Fasting 0  12 0.1  12.0 -- --   View all related data     Recent Data from [facility]  Related to Complete Blood Count-W/Diff  Component 03/31/25 03/19/25 03/18/25 03/17/25 03/17/25 02/19/25   WBC 9.6 -- -- -- -- 6.7   RBC 3.83 Low  -- -- -- -- 4.43   Hemoglobin 12.5 Low  11.7 Low   10.7 Low  13.6 -- 14.5   HCT 38.2 Low  -- -- -- -- 43.3   MCV 99.7 -- -- -- -- 97.7   MCH 32.6 -- -- -- -- 32.7   MCHC 32.7 -- -- -- -- 33.5   RDW 13.3 -- -- -- -- 12.6   Platelets 335 -- -- -- 309 272   Automated NRBC 0 -- -- -- -- 0   Neutrophil Absolute 7.3 High  -- -- -- -- 3.9   Lymphocyte Absolute 1.4 -- -- -- -- 1.9   Monocyte Absolute 0.7 -- -- -- -- 0.6   Eosinophil Absolute 0.1 -- -- -- -- 0.3   Basophil Absolute 0 -- -- -- -- 0.1   Immature Granulocyte % 0.4 -- -- -- -- 0.1   View all related data     IMPRESSION AND PLAN:    # Metastatic, hormone-resistant prostate cancer.    # prostate cancer metastatic to bone    - please see note from Dr. Hightower February 2025 regarding previous treatment and history in more detail.   - Diganosted in fall 2023 with metastatic prostate cancer to the bones began ADT/docetaxel/darolutamide which he tolerated well and continued with darolutamide.  PSA nicol was undetectable while on darolutamide, but began to rise in the fall of 2024.    - seen by our team for a second option trials vs. Pluvicto, vs alternative options.   - 1/22/25 with progressive disease on PSMA PET. Opted to trial pluvicto and received cycle 1 on 3/5/25. He does have baseline uptake in the salivary gland and lacrimal gland uptake on his PSMA pet scan so will need to monitor for dry mouth closely on pluvicto.   - Labs and clinically appropriate to proceed with cycle 2 on 4/16/25.   - returns May 12, 2025 prior to cycle 3.    33 minutes spent on the date of the encounter doing chart review, review of test results, interpretation of tests, patient visit, and documentation     Chelsey Whitmore PA-C      Virtual Visit Details    Type of service:  Video Visit   Video Start Time:  2:39  PM  Video End Time: 2:59 PM    Originating Location (pt. Location): Home  Distant Location (provider location):  On-site  Platform used for Video Visit: Walter      Again, thank you for allowing me to participate in the care of  your patient.        Sincerely,        Chelsey Whitmore PA-C    Electronically signed

## 2025-04-03 NOTE — PROGRESS NOTES
Virtual Visit Details    Type of service:  Video Visit   Video Start Time:  2:39  PM  Video End Time: 2:59 PM    Originating Location (pt. Location): Home  Distant Location (provider location):  On-site  Platform used for Video Visit: Walter

## 2025-04-03 NOTE — NURSING NOTE
Current patient location: [address] [place] [id number]    Is the patient currently in the state of [place]? YES    Visit mode: VIDEO    If the visit is dropped, the patient can be reconnected by:TELEPHONE VISIT: Phone number: [phone]    Will anyone else be joining the visit? NO  (If patient encounters technical issues they should call [phone]    Are changes needed to the allergy or medication list? No    Are refills needed on medications prescribed by this physician? NO    Rooming Documentation:  Questionnaire(s) completed    Reason for visit: RECHECK    Suzan LILLY

## 2025-04-03 NOTE — PROGRESS NOTES
[facility] Medical Oncology Return Visit Note       Date of visit: Apr 3, 2025    CC:  follow-up for mCPRC, prior cycle 2 pluvicto    INTERVAL HISTORY: Jd is seen virtually today. He had a hip replacement on 3/17/25. He is home. He is getting PT at home and is working on improving mobility. He is still using a walker but is feeling he could transition to a cane. PT is visiting him twice a week and then OT 1 time a week. Next week will be his final home PT. He has a follow up with orthopedics for check up. Pain in minimal in the right hip. He is taking tylenol as needed. He is continuing on lovenox per the direction of her surgeon. He tolerated the first cycle of pluvicto okay but did have 3-4 days of fatigue with mild constipation which he took 1 ducolax tablet. No nausea or vomiting. He has dry mouth especially in the night. No other pain. No chest pain, shortness of breath, or cough. No fevers or chills.     ONCOLOGY HISTORY:    #metastatic prostate cancer.    09/16/2023 Urology visit for lower urinary tract symptoms. Relatively low PSA in the past. 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   - Bladder US showed an enlarged prostate measuring 6.2 x 4.3 x 4.7 cm  - Cystoscopy was completed due to urinary retention. This showed severe trabeculation fo the baldder, he had enlarged prostate with an intravesical protion and lateral lobe enlargement. Discussed TURP procedure. Plan was to undergo a Urodynamic study.     11/16/2023   - PCP visit with Jarvis Gaxiola MD for fever. There was urinary retention and self cathing. Appeared septic with tachycardia, hypotension, and tachypnea. There is some loss of appetite. Referred to ED for admission.   - CT CAP showed no convincing evidence to confirm an etiology for fever in the chest, abdomen, or pelvis. Multiple small indistinct and infiltrative-appearing sclerotic areas throughout the spine, pelvis, and sternum. Metastatic disease cannot be  excluded. Prostate-specific antigen elevation from 10/11/2023 (19.9 at that time) raises suspicion for the prostate as a source. Indeterminate density 2.6 cm structure in the right kidney. This could potentially represent a hemorrhagic/proteinaceous cyst, but a hypoenhancing solid mass cannot be excluded.    11/17/2023 Right iliac bone biopsy. Pathology showed metastatic adenocarcinoma, consistent with prostate primary.     12/05/2023 Pet/CT scan showed findings suspicious for left prostate gland adenocarcinoma with widespread osseous metastases. No radiotracer uptake is identified within the biopsy-proven right papillary renal carcinoma.    01/23/2024 No BRACA 1 or 2 mutations. There was a + PODL1 mutation of unknown significance.     05/01/2024  - CT CAP showed no findings for new or progressive sites of metastatic disease. Increased density of diffuse sclerotic metastases may represent healing change.   - Bone scan showed widespread osteoblastic metastatic disease.     06/05/2024 Prostate chips, transurethral resection. Pathology showed prostatic adenocarcinoma, Grade Group 5, Ariela grade 5+5 (Score 10). 7% tissue involvement.     11/05/2024  - CT CAP showed diffuse sclerotic metastasis throughout the visualized skeleton not significantly changed since the 5/1/2024 CT exam.  Postoperative changes from a TURP. Left-sided colonic diverticulosis without acute diverticulitis.  PSA 0.3    - Bone scan showed no significant interval change in diffuse tracer avid osseous metastases.     11/26/2024-PSMA PET with avidity of small lesions.  PSA 0.3    2/3/2025-PSA 1.4.  PSMA PET scan notable for increased number of bony lesions.  Considering [facility] clinical trial.  Pending response from trial team.  Pluvicto is clearly an option here.  We have additional trials in the pipeline, but timeline for opening is not yet clear.  Would also highly consider [id number] at [facility] or [facility] as possible other options.       2/19/25 PSA 2.9- opted to proceed with pluvicto. Cycle 1 on 3/5/25. Concern for impending right hip fracture seen on 3/5/25 pluvicto imaging and underwent an right hip replacement on 03/17/2025 at [facility].     3/31/25 PSA 4.0- cycle 2 pluvicto on 4/16/25.     # R papillary renal cell carcinoma.    -11/28/2023: Right kidney needle core biopsy. Pathology showed renal cell carcinoma, favor papillary type, Grade 2  -5/1/24Stable right RCC.  -11/5/24-Interval decrease in size of a biopsy-proven renal cell carcinoma in the right kidney. Stable postoperative changes from a left nephrectomy.    GENOMIC STUDIES: NGS from prostate biopsy pending.  Germline testing negative for BRCA1/2.  PODL1 mutation of indeterminate significance.      TREATMENT HISTORY:     ADT  Docetaxel/darolutamide in [facility] setting.      GUIDELINES USED: NCCN    INTENT OF THERAPY: Palliative, discussed at 12/2/24 appointment.     CLINICAL TRIALS:  No available/open trials at initial visit.      PMH:  Papillary RCC, currently monitoring   Prostate cancer     PSH:  L nephrectomy as child for non-malignant etiology     ALLERGIES: Cats, seasonal     MEDS:  Current Outpatient Medications   Medication Instructions    albuterol (PROAIR HFA/PROVENTIL HFA/VENTOLIN HFA) 108 (90 Base) MCG/ACT inhaler 2 puffs    atorvastatin (LIPITOR) 40 MG tablet     Calcium Carbonate (CALCIUM 500 PO) Take  by mouth.    cycloSPORINE (RESTASIS) 0.05 % ophthalmic emulsion 1 drop, 2 TIMES DAILY    FISH OIL No dose, route, or frequency recorded.    fluticasone (FLONASE) 50 MCG/ACT nasal spray     Glucosamine-Chondroit-Vit C-Mn (GLUCOSAMINE 1500 COMPLEX) CAPS Take  by mouth.    loratadine (CLARITIN) 10 mg, DAILY    LORazepam (ATIVAN) 0.5-1 mg    Nubeqa 600 mg    omeprazole (PRILOSEC) 20 MG DR capsule     simvastatin (ZOCOR) 20 MG tablet AT BEDTIME       ROS-Remainder of 14 point ROS reviewed and negative except as in HPI.    PHYSICAL EXAM:  ECOG-PS=1  Video physical exam  General:  Patient appears well in no acute distress.   Skin: No visualized rash or lesions on visualized skin  Eyes: EOMI, no erythema, sclera icterus or discharge noted  Resp: Appears to be breathing comfortably without accessory muscle usage, speaking in full sentences, no cough  MSK: Appears to have normal range of motion based on visualized movements  Neurologic: No apparent tremors, facial movements symmetric  Psych: affect bright, alert and oriented    LABS AND IMAGES:    PSMA PET from 11/25/24-Personally reviewed.  Multiple small foci of PSMA-avid prostate cancer noted.      PSA trend, see oncology history.        [facility]  Related to Comp Metabolic Panel - standing every 4 weeks  Component 03/31/25 02/19/25 01/22/25 12/03/24 11/05/24 08/19/24   Sodium 143 145 139 144 143 144   Potassium 5.1 4.6 4.1 4.7 4.4 5.0   Chloride 108 109 109 109 108 108   CO2 26 26 23 27 26 27   Anion Gap 9 10 7 8 9 9   Calcium 9.6 9.2 9.0 9.9 9.7 10.5 High     BUN 12 11 15 14 16 16   Creatinine 0.7 Low  0.69 Low  0.70 Low  0.77 0.72 Low  0.78   Alkaline Phosphatase 110 96 69 82 75 69   AST (SGOT) 19 29 30 20 27 24   ALT (SGPT) 20 30 28 24 37 30   Bilirubin, Total 0.5 0.6 0.7 1.0 0.8 1.2   Protein, Total 6.7 6.8 6.3 Low  6.5 6.4 6.1 Low    Albumin 3.6 4 3.8 3.8 3.9 3.6   Glucose 149 High   108 High   111 High   104 High   101 High   100    GFR, Estimated >60 >60 >60 >60 >60 >60   Hours Fasting 0  12 0.1  12.0 -- --   View all related data     Recent Data from [facility]  Related to Complete Blood Count-W/Diff  Component 03/31/25 03/19/25 03/18/25 03/17/25 03/17/25 02/19/25   WBC 9.6 -- -- -- -- 6.7   RBC 3.83 Low  -- -- -- -- 4.43   Hemoglobin 12.5 Low  11.7 Low  10.7 Low  13.6 -- 14.5   HCT 38.2 Low  -- -- -- -- 43.3   MCV 99.7 -- -- -- -- 97.7   MCH 32.6 -- -- -- -- 32.7   MCHC 32.7 -- -- -- -- 33.5   RDW 13.3 -- -- -- -- 12.6   Platelets 335 -- -- -- 309 272   Automated NRBC 0 -- -- -- -- 0   Neutrophil Absolute  7.3 High  -- -- -- -- 3.9   Lymphocyte Absolute 1.4 -- -- -- -- 1.9   Monocyte Absolute 0.7 -- -- -- -- 0.6   Eosinophil Absolute 0.1 -- -- -- -- 0.3   Basophil Absolute 0 -- -- -- -- 0.1   Immature Granulocyte % 0.4 -- -- -- -- 0.1   View all related data     IMPRESSION AND PLAN:    # Metastatic, hormone-resistant prostate cancer.    # prostate cancer metastatic to bone    - please see note from Dr. Hightower February 2025 regarding previous treatment and history in more detail.   - Diganosted in fall 2023 with metastatic prostate cancer to the bones began ADT/docetaxel/darolutamide which he tolerated well and continued with darolutamide.  PSA nicol was undetectable while on darolutamide, but began to rise in the fall of 2024.    - seen by our team for a second option trials vs. Pluvicto, vs alternative options.   - 1/22/25 with progressive disease on PSMA PET. Opted to trial pluvicto and received cycle 1 on 3/5/25. He does have baseline uptake in the salivary gland and lacrimal gland uptake on his PSMA pet scan so will need to monitor for dry mouth closely on pluvicto.   - Labs and clinically appropriate to proceed with cycle 2 on 4/16/25.   - returns May 12, 2025 prior to cycle 3.    33 minutes spent on the date of the encounter doing chart review, review of test results, interpretation of tests, patient visit, and documentation     Chelsey Whitmore PA-C

## 2025-04-08 RX ORDER — MEPERIDINE HYDROCHLORIDE 25 MG/ML
25 INJECTION INTRAMUSCULAR; INTRAVENOUS; SUBCUTANEOUS
OUTPATIENT
Start: 2025-04-10

## 2025-04-08 RX ORDER — LORAZEPAM 2 MG/ML
.5-1 INJECTION INTRAMUSCULAR EVERY 6 HOURS PRN
OUTPATIENT
Start: 2025-04-10

## 2025-04-08 RX ORDER — ALBUTEROL SULFATE 0.83 MG/ML
2.5 SOLUTION RESPIRATORY (INHALATION)
OUTPATIENT
Start: 2025-04-10

## 2025-04-08 RX ORDER — ALBUTEROL SULFATE 90 UG/1
1-2 INHALANT RESPIRATORY (INHALATION)
Start: 2025-04-10

## 2025-04-08 RX ORDER — LORAZEPAM 0.5 MG/1
.5-1 TABLET ORAL EVERY 6 HOURS PRN
Start: 2025-04-10

## 2025-04-08 RX ORDER — ONDANSETRON 4 MG/1
8 TABLET, FILM COATED ORAL
OUTPATIENT
Start: 2025-04-10

## 2025-04-08 RX ORDER — DIPHENHYDRAMINE HYDROCHLORIDE 50 MG/ML
50 INJECTION, SOLUTION INTRAMUSCULAR; INTRAVENOUS
Start: 2025-04-10

## 2025-04-08 RX ORDER — EPINEPHRINE 1 MG/ML
0.3 INJECTION, SOLUTION, CONCENTRATE INTRAVENOUS EVERY 5 MIN PRN
OUTPATIENT
Start: 2025-04-10

## 2025-04-08 RX ORDER — PROCHLORPERAZINE MALEATE 5 MG/1
5 TABLET ORAL EVERY 6 HOURS PRN
Start: 2025-04-10

## 2025-04-08 RX ORDER — DIPHENHYDRAMINE HYDROCHLORIDE 50 MG/ML
25 INJECTION, SOLUTION INTRAMUSCULAR; INTRAVENOUS
Start: 2025-04-10

## 2025-04-16 ENCOUNTER — HOSPITAL ENCOUNTER (OUTPATIENT)
Dept: NUCLEAR MEDICINE | Facility: CLINIC | Age: 75
Setting detail: NUCLEAR MEDICINE
Discharge: HOME | End: 2025-04-16
Attending: STUDENT IN AN ORGANIZED HEALTH CARE EDUCATION/TRAINING PROGRAM
Payer: COMMERCIAL

## 2025-04-16 VITALS
DIASTOLIC BLOOD PRESSURE: 66 MMHG | RESPIRATION RATE: 16 BRPM | HEART RATE: 74 BPM | OXYGEN SATURATION: 98 % | SYSTOLIC BLOOD PRESSURE: 122 MMHG

## 2025-04-16 DIAGNOSIS — C79.51 METASTASIS TO BONE (H): Primary | ICD-10-CM

## 2025-04-16 DIAGNOSIS — Z19.2 METASTATIC CASTRATION-RESISTANT ADENOCARCINOMA OF PROSTATE (H): ICD-10-CM

## 2025-04-16 DIAGNOSIS — C61 METASTATIC CASTRATION-RESISTANT ADENOCARCINOMA OF PROSTATE (H): ICD-10-CM

## 2025-04-16 PROCEDURE — 79101 NUCLEAR RX IV ADMIN: CPT

## 2025-04-16 PROCEDURE — 344N000001 HC RX 344 MED OP 636: Performed by: STUDENT IN AN ORGANIZED HEALTH CARE EDUCATION/TRAINING PROGRAM

## 2025-04-16 PROCEDURE — 79101 NUCLEAR RX IV ADMIN: CPT | Mod: 26 | Performed by: RADIOLOGY

## 2025-04-16 PROCEDURE — A9607 HC RX 344 MED OP 636: HCPCS | Performed by: STUDENT IN AN ORGANIZED HEALTH CARE EDUCATION/TRAINING PROGRAM

## 2025-04-16 RX ORDER — ONDANSETRON 8 MG/1
8 TABLET, FILM COATED ORAL
Status: DISCONTINUED | OUTPATIENT
Start: 2025-04-16 | End: 2025-04-17 | Stop reason: HOSPADM

## 2025-04-16 RX ORDER — PROCHLORPERAZINE MALEATE 5 MG/1
5 TABLET ORAL EVERY 6 HOURS PRN
Status: DISCONTINUED | OUTPATIENT
Start: 2025-04-16 | End: 2025-04-17 | Stop reason: HOSPADM

## 2025-04-16 RX ORDER — MEPERIDINE HYDROCHLORIDE 25 MG/ML
25 INJECTION INTRAMUSCULAR; INTRAVENOUS; SUBCUTANEOUS
Status: DISCONTINUED | OUTPATIENT
Start: 2025-04-16 | End: 2025-04-17 | Stop reason: HOSPADM

## 2025-04-16 RX ORDER — DIPHENHYDRAMINE HYDROCHLORIDE 50 MG/ML
25 INJECTION, SOLUTION INTRAMUSCULAR; INTRAVENOUS
Status: DISCONTINUED | OUTPATIENT
Start: 2025-04-16 | End: 2025-04-17 | Stop reason: HOSPADM

## 2025-04-16 RX ORDER — METHYLPREDNISOLONE SODIUM SUCCINATE 40 MG/ML
40 INJECTION INTRAMUSCULAR; INTRAVENOUS
Status: DISCONTINUED | OUTPATIENT
Start: 2025-04-16 | End: 2025-04-17 | Stop reason: HOSPADM

## 2025-04-16 RX ORDER — LORAZEPAM 0.5 MG/1
.5-1 TABLET ORAL EVERY 6 HOURS PRN
Status: DISCONTINUED | OUTPATIENT
Start: 2025-04-16 | End: 2025-04-17 | Stop reason: HOSPADM

## 2025-04-16 RX ORDER — DIPHENHYDRAMINE HYDROCHLORIDE 50 MG/ML
50 INJECTION, SOLUTION INTRAMUSCULAR; INTRAVENOUS
Status: DISCONTINUED | OUTPATIENT
Start: 2025-04-16 | End: 2025-04-17 | Stop reason: HOSPADM

## 2025-04-16 RX ORDER — ALBUTEROL SULFATE 0.83 MG/ML
2.5 SOLUTION RESPIRATORY (INHALATION)
Status: DISCONTINUED | OUTPATIENT
Start: 2025-04-16 | End: 2025-04-17 | Stop reason: HOSPADM

## 2025-04-16 RX ORDER — EPINEPHRINE 1 MG/ML
0.3 INJECTION, SOLUTION, CONCENTRATE INTRAVENOUS EVERY 5 MIN PRN
Status: DISCONTINUED | OUTPATIENT
Start: 2025-04-16 | End: 2025-04-17 | Stop reason: HOSPADM

## 2025-04-16 RX ORDER — ALBUTEROL SULFATE 90 UG/1
1-2 INHALANT RESPIRATORY (INHALATION)
Status: DISCONTINUED | OUTPATIENT
Start: 2025-04-16 | End: 2025-04-17 | Stop reason: HOSPADM

## 2025-04-16 RX ORDER — LORAZEPAM 2 MG/ML
.5-1 INJECTION INTRAMUSCULAR EVERY 6 HOURS PRN
Status: DISCONTINUED | OUTPATIENT
Start: 2025-04-16 | End: 2025-04-17 | Stop reason: HOSPADM

## 2025-04-16 RX ADMIN — LUTETIUM LU 177 VIPIVOTIDE TETRAXETAN 200 MILLICURIE: 27 INJECTION, SOLUTION INTRAVENOUS at 10:59

## 2025-04-16 NOTE — PROGRESS NOTES
[facility] Nursing Note:    Patient presents today for Pluvicto therapy, dose 2 of  6  Patient seen by provider today: Yes: Dr Wiley       Intravenous Access:  Peripheral IV placed by Holli Chacon RN      Treatment Conditions:  Labs done on  3/31/25    Results reviewed, labs Met treatment parameters, ok to proceed with treatment.           Post Infusion Assessment:  Patient tolerated infusion without incident.    PIV - No evidence of extravasations, access discontinued per protocol.         Discharge Plan:   Patient will return as scheduled for next appointment.   Patient discharged at 11:29 AM  in stable condition accompanied by: spouse  Departure Mode: Ambulatory and Ambulatory Assist  with cane

## 2025-05-11 NOTE — PROGRESS NOTES
[facility] Medical Oncology Return Visit Note       Date of visit: May 12, 2025    CC:  follow-up for mCPRC, prior cycle 3 pluvicto    INTERVAL HISTORY: Recovering from R hip BRIDGER.  Appetite is good.  Energy is recovering.  Activity has been limited post-op.  No pain at the moment.  Dry mouth has been minimal, mainly overnight and resolves quickly.  No changes in  taste.  No impact of dry mouth on eating or drinking.  No dry eyes.      ONCOLOGY HISTORY:    #metastatic prostate cancer.    09/16/2023 Urology visit for lower urinary tract symptoms. Relatively low PSA in the past. 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   - Bladder US showed an enlarged prostate measuring 6.2 x 4.3 x 4.7 cm  - Cystoscopy was completed due to urinary retention. This showed severe trabeculation fo the baldder, he had enlarged prostate with an intravesical protion and lateral lobe enlargement. Discussed TURP procedure. Plan was to undergo a Urodynamic study.     11/16/2023   - PCP visit with Jarvis Gaxiola MD for fever. There was urinary retention and self cathing. Appeared septic with tachycardia, hypotension, and tachypnea. There is some loss of appetite. Referred to ED for admission.   - CT CAP showed no convincing evidence to confirm an etiology for fever in the chest, abdomen, or pelvis. Multiple small indistinct and infiltrative-appearing sclerotic areas throughout the spine, pelvis, and sternum. Metastatic disease cannot be excluded. Prostate-specific antigen elevation from 10/11/2023 (19.9 at that time) raises suspicion for the prostate as a source. Indeterminate density 2.6 cm structure in the right kidney. This could potentially represent a hemorrhagic/proteinaceous cyst, but a hypoenhancing solid mass cannot be excluded.    11/17/2023 Right iliac bone biopsy. Pathology showed metastatic adenocarcinoma, consistent with prostate primary.     12/05/2023 Pet/CT scan showed findings suspicious for left prostate  gland adenocarcinoma with widespread osseous metastases. No radiotracer uptake is identified within the biopsy-proven right papillary renal carcinoma.    01/23/2024 No BRACA 1 or 2 mutations. There was a + PODL1 mutation of unknown significance.     05/01/2024  - CT CAP showed no findings for new or progressive sites of metastatic disease. Increased density of diffuse sclerotic metastases may represent healing change.   - Bone scan showed widespread osteoblastic metastatic disease.     06/05/2024 Prostate chips, transurethral resection. Pathology showed prostatic adenocarcinoma, Grade Group 5, Ariela grade 5+5 (Score 10). 7% tissue involvement.     11/05/2024  - CT CAP showed diffuse sclerotic metastasis throughout the visualized skeleton not significantly changed since the 5/1/2024 CT exam.  Postoperative changes from a TURP. Left-sided colonic diverticulosis without acute diverticulitis.  PSA 0.3    - Bone scan showed no significant interval change in diffuse tracer avid osseous metastases.     11/26/2024-PSMA PET with avidity of small lesions.  PSA 0.3    2/3/2025-PSA 1.4.  PSMA PET scan notable for increased number of bony lesions.  Considering [employer] clinical trial.  Pending response from trial team.  Pluvicto is clearly an option here.  We have additional trials in the pipeline, but timeline for opening is not yet clear.  Would also highly consider [id number] at [facility] or [facility] as possible other options.      2/19/25 PSA 2.9- opted to proceed with pluvicto. Cycle 1 on 3/5/25. Concern for impending right hip fracture seen on 3/5/25 pluvicto imaging and underwent an right hip replacement on 03/17/2025 at [facility].     3/31/25 PSA 4.0- cycle 2 pluvicto on 4/16/25.     4/21/25 PSA 4.6    5/12/25 PSA pending prior to cycle 3 of Pluvicto.  PSA 6.4.  Labs OK to proceed with Dose #3 of Pluvicto.    Will evaluate SPECT and PSMA for progression versus PSA treatment flare.  If progressing on imaging, will  discontinue Pluvicto.      # R papillary renal cell carcinoma.    -11/28/2023: Right kidney needle core biopsy. Pathology showed renal cell carcinoma, favor papillary type, Grade 2  -5/1/24-Stable right RCC.  -11/5/24-Interval decrease in size of a biopsy-proven renal cell carcinoma in the right kidney. Stable postoperative changes from a left nephrectomy.    GENOMIC STUDIES: NGS from prostate biopsy pending.  Germline testing negative for BRCA1/2.  PODL1 mutation of indeterminate significance.      TREATMENT HISTORY:     ADT  Docetaxel/darolutamide in [facility] setting.    Pluvicto plus darolutamide x3 cycles.      GUIDELINES USED: NCCN    INTENT OF THERAPY: Palliative, discussed at 12/2/24 appointment.     CLINICAL TRIALS:  No available/open trials at initial visit.      PMH:  Papillary RCC, currently monitoring   Prostate cancer     PSH:  L nephrectomy as child for non-malignant etiology     ALLERGIES: Cats, seasonal     MEDS:  Current Outpatient Medications   Medication Instructions    albuterol (PROAIR HFA/PROVENTIL HFA/VENTOLIN HFA) 108 (90 Base) MCG/ACT inhaler 2 puffs    atorvastatin (LIPITOR) 40 MG tablet     Calcium Carbonate (CALCIUM 500 PO) Take  by mouth.    cycloSPORINE (RESTASIS) 0.05 % ophthalmic emulsion 1 drop, 2 TIMES DAILY    FISH OIL No dose, route, or frequency recorded.    fluticasone (FLONASE) 50 MCG/ACT nasal spray     Glucosamine-Chondroit-Vit C-Mn (GLUCOSAMINE 1500 COMPLEX) CAPS Take  by mouth.    loratadine (CLARITIN) 10 mg, DAILY    LORazepam (ATIVAN) 0.5-1 mg    Nubeqa 600 mg    omeprazole (PRILOSEC) 20 MG DR capsule     simvastatin (ZOCOR) 20 MG tablet AT BEDTIME       ROS-Remainder of 14 point ROS reviewed and negative except as in HPI.    PHYSICAL EXAM:  ECOG-PS=1  /82 (BP Location: Right arm, Patient Position: Sitting, Cuff Size: Adult Regular)   Pulse 58   Temp 98.1  F (36.7  C) (Oral)   Resp 16   Wt 82.8 kg (182 lb 9.6 oz)   SpO2 100%   BMI 27.76 kg/m    General: Patient  appears well in no acute distress.   Skin: No visualized rash or lesions on visualized skin  Eyes: EOMI, no erythema, sclera icterus or discharge noted  Resp: Appears to be breathing comfortably without accessory muscle usage, speaking in full sentences, no cough  MSK: Appears to have normal range of motion based on visualized movements  Neurologic: No apparent tremors, facial movements symmetric  Psych: affect bright, alert and oriented    LABS AND IMAGES:    PSMA PET from 11/25/24-Personally reviewed.  Multiple small foci of PSMA-avid prostate cancer noted.      PSA trend, see oncology history.      CBC from [facility] on 5/9/25: WBC 5.5, Hgb 12.0, Plt 209  CMP with normal GFR >60 and mild decrease in total protein.      IMPRESSION AND PLAN:    # Metastatic, hormone-resistant prostate cancer.    # prostate cancer metastatic to bone    - please see note my note from February 2025 regarding previous treatment and history in more detail.   - Diganosted in fall 2023 with metastatic prostate cancer to the bones began ADT/docetaxel/darolutamide which he tolerated well and continued with darolutamide.  PSA nicol was undetectable while on darolutamide, but began to rise in the fall of 2024.    - seen by our team for a second option trials vs. Pluvicto, vs alternative options.   - 1/22/25 with progressive disease on PSMA PET. Opted to trial pluvicto and received cycle 1 on 3/5/25. He does have baseline uptake in the salivary gland and lacrimal gland uptake on his PSMA pet scan so will need to monitor for dry mouth closely on pluvicto.   - Labs and clinically appropriate to proceed with cycle 3 of Pluvicto as scheduled on 5/28/25.    -We will evaluate SPECT and PSMA PET (at [facility]) regarding Cycle 4 and beyond.      PLAN:     OK for Pluvicto dose #3 on 5/28/25 as scheduled.   SPECT with next dose of Pluvicto.   PSMA PET with Dr. Rodriguez on 6/23.   See Chelsey as scheduled on 6/25 to decide on Dose #4 of Pluvicto.   See  me 6/30 to discuss treatment options if PSMA PET/SPECT shows progression.      A total of 60 minutes spent on the date of the encounter doing chart review, review of test results, interpretation of tests, patient visit, and documentation.  The patient was given the opportunity to ask multiple questions today, all of which were answered to their satisfaction.    The longitudinal plan of care for mCPRC was addressed during this visit. Due to the added complexity in care, I will continue to support Jd Restrepo in the subsequent management of this condition(s) and with the ongoing continuity of care of this condition(s).     Tan Hightower MD, PhD   of Medicine   Oncology/BMT/Cellular Therapies

## 2025-05-12 ENCOUNTER — ONCOLOGY VISIT (OUTPATIENT)
Dept: ONCOLOGY | Facility: CLINIC | Age: 75
End: 2025-05-12
Attending: STUDENT IN AN ORGANIZED HEALTH CARE EDUCATION/TRAINING PROGRAM
Payer: COMMERCIAL

## 2025-05-12 VITALS
SYSTOLIC BLOOD PRESSURE: 137 MMHG | HEART RATE: 58 BPM | DIASTOLIC BLOOD PRESSURE: 82 MMHG | RESPIRATION RATE: 16 BRPM | TEMPERATURE: 98.1 F | BODY MASS INDEX: 27.76 KG/M2 | OXYGEN SATURATION: 100 % | WEIGHT: 182.6 LBS

## 2025-05-12 DIAGNOSIS — C79.51 PROSTATE CANCER METASTATIC TO BONE (H): Primary | ICD-10-CM

## 2025-05-12 DIAGNOSIS — Z19.2 HORMONE RESISTANT PROSTATE CANCER (H): ICD-10-CM

## 2025-05-12 DIAGNOSIS — C61 METASTATIC CASTRATION-RESISTANT ADENOCARCINOMA OF PROSTATE (H): ICD-10-CM

## 2025-05-12 DIAGNOSIS — C61 HORMONE RESISTANT PROSTATE CANCER (H): ICD-10-CM

## 2025-05-12 DIAGNOSIS — C61 PROSTATE CANCER METASTATIC TO BONE (H): Primary | ICD-10-CM

## 2025-05-12 DIAGNOSIS — Z19.2 METASTATIC CASTRATION-RESISTANT ADENOCARCINOMA OF PROSTATE (H): ICD-10-CM

## 2025-05-12 PROCEDURE — 99417 PROLNG OP E/M EACH 15 MIN: CPT | Performed by: STUDENT IN AN ORGANIZED HEALTH CARE EDUCATION/TRAINING PROGRAM

## 2025-05-12 PROCEDURE — 99215 OFFICE O/P EST HI 40 MIN: CPT | Performed by: STUDENT IN AN ORGANIZED HEALTH CARE EDUCATION/TRAINING PROGRAM

## 2025-05-12 PROCEDURE — G2211 COMPLEX E/M VISIT ADD ON: HCPCS | Performed by: STUDENT IN AN ORGANIZED HEALTH CARE EDUCATION/TRAINING PROGRAM

## 2025-05-12 PROCEDURE — G0463 HOSPITAL OUTPT CLINIC VISIT: HCPCS | Performed by: STUDENT IN AN ORGANIZED HEALTH CARE EDUCATION/TRAINING PROGRAM

## 2025-05-12 RX ORDER — LORAZEPAM 0.5 MG/1
.5-1 TABLET ORAL EVERY 6 HOURS PRN
Start: 2025-05-22

## 2025-05-12 RX ORDER — ALBUTEROL SULFATE 0.83 MG/ML
2.5 SOLUTION RESPIRATORY (INHALATION)
OUTPATIENT
Start: 2025-05-22

## 2025-05-12 RX ORDER — PROCHLORPERAZINE MALEATE 5 MG/1
5 TABLET ORAL EVERY 6 HOURS PRN
Start: 2025-05-22

## 2025-05-12 RX ORDER — METHYLPREDNISOLONE SODIUM SUCCINATE 40 MG/ML
40 INJECTION INTRAMUSCULAR; INTRAVENOUS
Start: 2025-05-22

## 2025-05-12 RX ORDER — MEPERIDINE HYDROCHLORIDE 25 MG/ML
25 INJECTION INTRAMUSCULAR; INTRAVENOUS; SUBCUTANEOUS
OUTPATIENT
Start: 2025-05-22

## 2025-05-12 RX ORDER — ALBUTEROL SULFATE 90 UG/1
1-2 INHALANT RESPIRATORY (INHALATION)
Start: 2025-05-22

## 2025-05-12 RX ORDER — ACETAMINOPHEN 500 MG
TABLET ORAL
COMMUNITY
Start: 2025-03-20

## 2025-05-12 RX ORDER — DIPHENHYDRAMINE HYDROCHLORIDE 50 MG/ML
50 INJECTION, SOLUTION INTRAMUSCULAR; INTRAVENOUS
Start: 2025-05-22

## 2025-05-12 RX ORDER — DIPHENHYDRAMINE HYDROCHLORIDE 50 MG/ML
25 INJECTION, SOLUTION INTRAMUSCULAR; INTRAVENOUS
Start: 2025-05-22

## 2025-05-12 RX ORDER — ONDANSETRON 4 MG/1
8 TABLET, FILM COATED ORAL
OUTPATIENT
Start: 2025-05-22

## 2025-05-12 RX ORDER — LORAZEPAM 2 MG/ML
.5-1 INJECTION INTRAMUSCULAR EVERY 6 HOURS PRN
OUTPATIENT
Start: 2025-05-22

## 2025-05-12 RX ORDER — EPINEPHRINE 1 MG/ML
0.3 INJECTION, SOLUTION INTRAMUSCULAR; SUBCUTANEOUS EVERY 5 MIN PRN
OUTPATIENT
Start: 2025-05-22

## 2025-05-12 ASSESSMENT — PAIN SCALES - GENERAL: PAINLEVEL_OUTOF10: NO PAIN (0)

## 2025-05-12 NOTE — LETTER
5/12/2025      Jd Restrepo  [address]  [place] [address]      Dear Colleague,    Thank you for referring your patient, Jd Restrepo, to the [facility]. Please see a copy of my visit note below.      [facility] Medical Oncology Return Visit Note       Date of visit: May 12, 2025    CC:  follow-up for mCPRC, prior cycle 3 pluvicto    INTERVAL HISTORY: Recovering from R hip BRIDGER.  Appetite is good.  Energy is recovering.  Activity has been limited post-op.  No pain at the moment.  Dry mouth has been minimal, mainly overnight and resolves quickly.  No changes in  taste.  No impact of dry mouth on eating or drinking.  No dry eyes.      ONCOLOGY HISTORY:    #metastatic prostate cancer.    09/16/2023 Urology visit for lower urinary tract symptoms. Relatively low PSA in the past. 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   - Bladder US showed an enlarged prostate measuring 6.2 x 4.3 x 4.7 cm  - Cystoscopy was completed due to urinary retention. This showed severe trabeculation fo the baldder, he had enlarged prostate with an intravesical protion and lateral lobe enlargement. Discussed TURP procedure. Plan was to undergo a Urodynamic study.     11/16/2023   - PCP visit with Jarvis Gaxiola MD for fever. There was urinary retention and self cathing. Appeared septic with tachycardia, hypotension, and tachypnea. There is some loss of appetite. Referred to ED for admission.   - CT CAP showed no convincing evidence to confirm an etiology for fever in the chest, abdomen, or pelvis. Multiple small indistinct and infiltrative-appearing sclerotic areas throughout the spine, pelvis, and sternum. Metastatic disease cannot be excluded. Prostate-specific antigen elevation from 10/11/2023 (19.9 at that time) raises suspicion for the prostate as a source. Indeterminate density 2.6 cm structure in the right kidney. This could potentially represent a  hemorrhagic/proteinaceous cyst, but a hypoenhancing solid mass cannot be excluded.    11/17/2023 Right iliac bone biopsy. Pathology showed metastatic adenocarcinoma, consistent with prostate primary.     12/05/2023 Pet/CT scan showed findings suspicious for left prostate gland adenocarcinoma with widespread osseous metastases. No radiotracer uptake is identified within the biopsy-proven right papillary renal carcinoma.    01/23/2024 No BRACA 1 or 2 mutations. There was a + PODL1 mutation of unknown significance.     05/01/2024  - CT CAP showed no findings for new or progressive sites of metastatic disease. Increased density of diffuse sclerotic metastases may represent healing change.   - Bone scan showed widespread osteoblastic metastatic disease.     06/05/2024 Prostate chips, transurethral resection. Pathology showed prostatic adenocarcinoma, Grade Group 5, Ariela grade 5+5 (Score 10). 7% tissue involvement.     11/05/2024  - CT CAP showed diffuse sclerotic metastasis throughout the visualized skeleton not significantly changed since the 5/1/2024 CT exam.  Postoperative changes from a TURP. Left-sided colonic diverticulosis without acute diverticulitis.  PSA 0.3    - Bone scan showed no significant interval change in diffuse tracer avid osseous metastases.     11/26/2024-PSMA PET with avidity of small lesions.  PSA 0.3    2/3/2025-PSA 1.4.  PSMA PET scan notable for increased number of bony lesions.  Considering [facility] clinical trial.  Pending response from trial team.  Pluvicto is clearly an option here.  We have additional trials in the pipeline, but timeline for opening is not yet clear.  Would also highly consider [id number] at [facility] or [facility] as possible other options.      2/19/25 PSA 2.9- opted to proceed with pluvicto. Cycle 1 on 3/5/25. Concern for impending right hip fracture seen on 3/5/25 pluvicto imaging and underwent an right hip replacement on 03/17/2025 at [facility].     3/31/25 PSA 4.0-  cycle 2 pluvicto on 4/16/25.     4/21/25 PSA 4.6    5/12/25 PSA pending prior to cycle 3 of Pluvicto.  PSA 6.4.  Labs OK to proceed with Dose #3 of Pluvicto.    Will evaluate SPECT and PSMA for progression versus PSA treatment flare.  If progressing on imaging, will discontinue Pluvicto.      # R papillary renal cell carcinoma.    -11/28/2023: Right kidney needle core biopsy. Pathology showed renal cell carcinoma, favor papillary type, Grade 2  -5/1/24-Stable right RCC.  -11/5/24-Interval decrease in size of a biopsy-proven renal cell carcinoma in the right kidney. Stable postoperative changes from a left nephrectomy.    GENOMIC STUDIES: NGS from prostate biopsy pending.  Germline testing negative for BRCA1/2.  PODL1 mutation of indeterminate significance.      TREATMENT HISTORY:     ADT  Docetaxel/darolutamide in [facility] setting.    Pluvicto plus darolutamide x3 cycles.      GUIDELINES USED: NCCN    INTENT OF THERAPY: Palliative, discussed at 12/2/24 appointment.     CLINICAL TRIALS:  No available/open trials at initial visit.      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     cycloSPORINE (RESTASIS) 0.05 % ophthalmic emulsion 1 drop, 2 TIMES DAILY     FISH OIL No dose, route, or frequency recorded.     fluticasone (FLONASE) 50 MCG/ACT nasal spray      Glucosamine-Chondroit-Vit C-Mn (GLUCOSAMINE 1500 COMPLEX) CAPS Take  by mouth.     loratadine (CLARITIN) 10 mg, DAILY     LORazepam (ATIVAN) 0.5-1 mg     Nubeqa 600 mg     omeprazole (PRILOSEC) 20 MG DR capsule      simvastatin (ZOCOR) 20 MG tablet AT BEDTIME       ROS-Remainder of 14 point ROS reviewed and  negative except as in HPI.    PHYSICAL EXAM:  ECOG-PS=1  /82 (BP Location: Right arm, Patient Position: Sitting, Cuff Size: Adult Regular)   Pulse 58   Temp 98.1  F (36.7  C) (Oral)   Resp 16   Wt 82.8 kg (182 lb 9.6 oz)   SpO2 100%   BMI 27.76 kg/m    General: Patient appears well in no acute distress.   Skin: No visualized rash or lesions on visualized skin  Eyes: EOMI, no erythema, sclera icterus or discharge noted  Resp: Appears to be breathing comfortably without accessory muscle usage, speaking in full sentences, no cough  MSK: Appears to have normal range of motion based on visualized movements  Neurologic: No apparent tremors, facial movements symmetric  Psych: affect bright, alert and oriented    LABS AND IMAGES:    PSMA PET from 11/25/24-Personally reviewed.  Multiple small foci of PSMA-avid prostate cancer noted.      PSA trend, see oncology history.      CBC from [facility] on 5/9/25: WBC 5.5, Hgb 12.0, Plt 209  CMP with normal GFR >60 and mild decrease in total protein.      IMPRESSION AND PLAN:    # Metastatic, hormone-resistant prostate cancer.    # prostate cancer metastatic to bone    - please see note my note from February 2025 regarding previous treatment and history in more detail.   - Diganosted in fall 2023 with metastatic prostate cancer to the bones began ADT/docetaxel/darolutamide which he tolerated well and continued with darolutamide.  PSA nicol was undetectable while on darolutamide, but began to rise in the fall of 2024.    - seen by our team for a second option trials vs. Pluvicto, vs alternative options.   - 1/22/25 with progressive disease on PSMA PET. Opted to trial pluvicto and received cycle 1 on 3/5/25. He does have baseline uptake in the salivary gland and lacrimal gland uptake on his PSMA pet scan so will need to monitor for dry mouth closely on pluvicto.   - Labs and clinically appropriate to proceed with cycle 3 of Pluvicto as scheduled on 5/28/25.    -We will  evaluate SPECT and PSMA PET (at [facility]) regarding Cycle 4 and beyond.      PLAN:     OK for Pluvicto dose #3 on 5/28/25 as scheduled.   SPECT with next dose of Pluvicto.   PSMA PET with Dr. Rodriguez on 6/23.   See Chelsey as scheduled on 6/25 to decide on Dose #4 of Pluvicto.   See me 6/30 to discuss treatment options if PSMA PET/SPECT shows progression.      A total of 60 minutes spent on the date of the encounter doing chart review, review of test results, interpretation of tests, patient visit, and documentation.  The patient was given the opportunity to ask multiple questions today, all of which were answered to their satisfaction.    The longitudinal plan of care for mCPRC was addressed during this visit. Due to the added complexity in care, I will continue to support Jd Restrepo in the subsequent management of this condition(s) and with the ongoing continuity of care of this condition(s).     Tan Hightower MD, PhD   of Medicine   Oncology/BMT/Cellular Therapies        Again, thank you for allowing me to participate in the care of your patient.        Sincerely,        Tan Hightower MD    Electronically signed

## 2025-05-12 NOTE — NURSING NOTE
"Oncology Rooming Note    May 12, 2025 11:24 AM   Jd Restrepo is a 74 year old male who presents for:    Chief Complaint   Patient presents with    Oncology Clinic Visit     Metastatic castration-resistant adenocarcinoma of prostate     Initial Vitals: /82 (BP Location: Right arm, Patient Position: Sitting, Cuff Size: Adult Regular)   Pulse 58   Temp 98.1  F (36.7  C) (Oral)   Resp 16   Wt 82.8 kg (182 lb 9.6 oz)   SpO2 100%   BMI 27.76 kg/m   Estimated body mass index is 27.76 kg/m  as calculated from the following:    Height as of 4/3/25: 1.727 m (5' 8\").    Weight as of this encounter: 82.8 kg (182 lb 9.6 oz). Body surface area is 1.99 meters squared.  No Pain (0) Comment: Data Unavailable   No LMP for male patient.  Allergies reviewed: Yes  Medications reviewed: Yes    Medications: Medication refills not needed today.  Pharmacy name entered into [facility]: [id number] [place] [id number] [address] FROM [address]    Frailty Screening:   Is the patient here for a new oncology consult visit in cancer care? 2. No    PHQ9:  Did this patient require a PHQ9?: No      Clinical concerns: none.       Sreekanth Lazar"

## 2025-05-12 NOTE — PATIENT INSTRUCTIONS
Jd,   Here is what we discussed today:     OK for Pluvicto dose #3 on 5/28/25 as scheduled.   SPECT with next dose of Pluvicto.   PSMA PET with Dr. Rodriguez on 6/23.   See Chelsey as scheduled on 6/25 to decide on Dose #4 of Pluvicto.   See me 6/30 to discuss treatment options if PSMA PET/SPECT shows progression.

## 2025-05-28 ENCOUNTER — HOSPITAL ENCOUNTER (OUTPATIENT)
Dept: NUCLEAR MEDICINE | Facility: CLINIC | Age: 75
Setting detail: NUCLEAR MEDICINE
Discharge: HOME | End: 2025-05-28
Attending: STUDENT IN AN ORGANIZED HEALTH CARE EDUCATION/TRAINING PROGRAM
Payer: COMMERCIAL

## 2025-05-28 VITALS
RESPIRATION RATE: 16 BRPM | HEART RATE: 81 BPM | DIASTOLIC BLOOD PRESSURE: 56 MMHG | SYSTOLIC BLOOD PRESSURE: 132 MMHG | OXYGEN SATURATION: 99 %

## 2025-05-28 DIAGNOSIS — Z19.2 METASTATIC CASTRATION-RESISTANT ADENOCARCINOMA OF PROSTATE (H): ICD-10-CM

## 2025-05-28 DIAGNOSIS — C79.51 METASTASIS TO BONE (H): Primary | ICD-10-CM

## 2025-05-28 DIAGNOSIS — C61 METASTATIC CASTRATION-RESISTANT ADENOCARCINOMA OF PROSTATE (H): ICD-10-CM

## 2025-05-28 PROCEDURE — 78830 RP LOCLZJ TUM SPECT W/CT 1: CPT

## 2025-05-28 PROCEDURE — A9607 HC RX 344 MED OP 636: HCPCS | Performed by: STUDENT IN AN ORGANIZED HEALTH CARE EDUCATION/TRAINING PROGRAM

## 2025-05-28 PROCEDURE — 344N000001 HC RX 344 MED OP 636: Performed by: STUDENT IN AN ORGANIZED HEALTH CARE EDUCATION/TRAINING PROGRAM

## 2025-05-28 RX ORDER — PROCHLORPERAZINE MALEATE 5 MG/1
5 TABLET ORAL EVERY 6 HOURS PRN
Status: DISCONTINUED | OUTPATIENT
Start: 2025-05-28 | End: 2025-05-29 | Stop reason: HOSPADM

## 2025-05-28 RX ORDER — ONDANSETRON 8 MG/1
8 TABLET, FILM COATED ORAL
Status: DISCONTINUED | OUTPATIENT
Start: 2025-05-28 | End: 2025-05-29 | Stop reason: HOSPADM

## 2025-05-28 RX ORDER — ALBUTEROL SULFATE 90 UG/1
1-2 INHALANT RESPIRATORY (INHALATION)
Status: DISCONTINUED | OUTPATIENT
Start: 2025-05-28 | End: 2025-05-29 | Stop reason: HOSPADM

## 2025-05-28 RX ORDER — ALBUTEROL SULFATE 0.83 MG/ML
2.5 SOLUTION RESPIRATORY (INHALATION)
Status: DISCONTINUED | OUTPATIENT
Start: 2025-05-28 | End: 2025-05-29 | Stop reason: HOSPADM

## 2025-05-28 RX ORDER — DIPHENHYDRAMINE HYDROCHLORIDE 50 MG/ML
50 INJECTION, SOLUTION INTRAMUSCULAR; INTRAVENOUS
Status: DISCONTINUED | OUTPATIENT
Start: 2025-05-28 | End: 2025-05-29 | Stop reason: HOSPADM

## 2025-05-28 RX ORDER — LORAZEPAM 0.5 MG/1
.5-1 TABLET ORAL EVERY 6 HOURS PRN
Status: DISCONTINUED | OUTPATIENT
Start: 2025-05-28 | End: 2025-05-29 | Stop reason: HOSPADM

## 2025-05-28 RX ORDER — DIPHENHYDRAMINE HYDROCHLORIDE 50 MG/ML
25 INJECTION, SOLUTION INTRAMUSCULAR; INTRAVENOUS
Status: DISCONTINUED | OUTPATIENT
Start: 2025-05-28 | End: 2025-05-29 | Stop reason: HOSPADM

## 2025-05-28 RX ORDER — LORAZEPAM 2 MG/ML
.5-1 INJECTION INTRAMUSCULAR EVERY 6 HOURS PRN
Status: DISCONTINUED | OUTPATIENT
Start: 2025-05-28 | End: 2025-05-29 | Stop reason: HOSPADM

## 2025-05-28 RX ORDER — MEPERIDINE HYDROCHLORIDE 25 MG/ML
25 INJECTION INTRAMUSCULAR; INTRAVENOUS; SUBCUTANEOUS
Status: DISCONTINUED | OUTPATIENT
Start: 2025-05-28 | End: 2025-05-29 | Stop reason: HOSPADM

## 2025-05-28 RX ORDER — EPINEPHRINE 1 MG/ML
0.3 INJECTION, SOLUTION, CONCENTRATE INTRAVENOUS EVERY 5 MIN PRN
Status: DISCONTINUED | OUTPATIENT
Start: 2025-05-28 | End: 2025-05-29 | Stop reason: HOSPADM

## 2025-05-28 RX ORDER — METHYLPREDNISOLONE SODIUM SUCCINATE 40 MG/ML
40 INJECTION INTRAMUSCULAR; INTRAVENOUS
Status: DISCONTINUED | OUTPATIENT
Start: 2025-05-28 | End: 2025-05-29 | Stop reason: HOSPADM

## 2025-05-28 RX ADMIN — LUTETIUM LU 177 VIPIVOTIDE TETRAXETAN 200 MILLICURIE: 27 INJECTION, SOLUTION INTRAVENOUS at 10:11

## 2025-05-28 NOTE — PROGRESS NOTES
[facility] Nursing Note:    Patient presents today for Pluvicto therapy, dose 3 of  6  Patient seen by provider today: No Dr Sanders        Intravenous Access:  Peripheral IV placed by Jinny Chacon RN     Treatment Conditions:  Labs done on 05/09/25    Results reviewed, labs Met treatment parameters, ok to proceed with treatment.           Post Infusion Assessment:  Patient tolerated infusion without incident.    PIV - No evidence of extravasations, access discontinued per protocol.         Discharge Plan:   Patient will return as scheduled for next appointment.   Patient discharged at 11am in stable condition accompanied by: His wife  Departure Mode: Ambulatory

## 2025-06-25 ENCOUNTER — VIRTUAL VISIT (OUTPATIENT)
Dept: ONCOLOGY | Facility: CLINIC | Age: 75
End: 2025-06-25
Payer: COMMERCIAL

## 2025-06-25 VITALS — BODY MASS INDEX: 26.07 KG/M2 | HEIGHT: 69 IN | WEIGHT: 176 LBS

## 2025-06-25 DIAGNOSIS — C79.51 METASTASIS TO BONE (H): ICD-10-CM

## 2025-06-25 DIAGNOSIS — Z19.2 METASTATIC CASTRATION-RESISTANT ADENOCARCINOMA OF PROSTATE (H): Primary | ICD-10-CM

## 2025-06-25 DIAGNOSIS — C61 METASTATIC CASTRATION-RESISTANT ADENOCARCINOMA OF PROSTATE (H): Primary | ICD-10-CM

## 2025-06-25 PROCEDURE — G2211 COMPLEX E/M VISIT ADD ON: HCPCS

## 2025-06-25 PROCEDURE — 98006 SYNCH AUDIO-VIDEO EST MOD 30: CPT

## 2025-06-25 PROCEDURE — 1126F AMNT PAIN NOTED NONE PRSNT: CPT

## 2025-06-25 ASSESSMENT — PAIN SCALES - GENERAL: PAINLEVEL_OUTOF10: NO PAIN (0)

## 2025-06-25 NOTE — PROGRESS NOTES
[facility] Medical Oncology Return Visit Note       Date of visit: Jun 25, 2025    CC:  follow-up for [facility], prior cycle 4 pluvicto    INTERVAL HISTORY:   Jd is seen virtually today. He has plans for travel in early August. Jd reports the right hip is getting slowly better. He is getting around walking better. He hasn't noticed any dry eyes with pluvicto but has been told he has this and uses restasis drops. He does have dry mouth at night. This is not bothersome during the day. He drinks water throughout the night. No diarrhea. He did constipation for a couple of days after his last dose. Appetite is good.     ONCOLOGY HISTORY:    #metastatic prostate cancer.    09/16/2023 Urology visit for lower urinary tract symptoms. Relatively low PSA in the past. 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   - Bladder US showed an enlarged prostate measuring 6.2 x 4.3 x 4.7 cm  - Cystoscopy was completed due to urinary retention. This showed severe trabeculation fo the baldder, he had enlarged prostate with an intravesical protion and lateral lobe enlargement. Discussed TURP procedure. Plan was to undergo a Urodynamic study.     11/16/2023   - PCP visit with Jarvis Gaxiola MD for fever. There was urinary retention and self cathing. Appeared septic with tachycardia, hypotension, and tachypnea. There is some loss of appetite. Referred to ED for admission.   - CT CAP showed no convincing evidence to confirm an etiology for fever in the chest, abdomen, or pelvis. Multiple small indistinct and infiltrative-appearing sclerotic areas throughout the spine, pelvis, and sternum. Metastatic disease cannot be excluded. Prostate-specific antigen elevation from 10/11/2023 (19.9 at that time) raises suspicion for the prostate as a source. Indeterminate density 2.6 cm structure in the right kidney. This could potentially represent a hemorrhagic/proteinaceous cyst, but a hypoenhancing solid mass cannot be  excluded.    11/17/2023 Right iliac bone biopsy. Pathology showed metastatic adenocarcinoma, consistent with prostate primary.     12/05/2023 Pet/CT scan showed findings suspicious for left prostate gland adenocarcinoma with widespread osseous metastases. No radiotracer uptake is identified within the biopsy-proven right papillary renal carcinoma.    01/23/2024 No BRACA 1 or 2 mutations. There was a + PODL1 mutation of unknown significance.     05/01/2024  - CT CAP showed no findings for new or progressive sites of metastatic disease. Increased density of diffuse sclerotic metastases may represent healing change.   - Bone scan showed widespread osteoblastic metastatic disease.     06/05/2024 Prostate chips, transurethral resection. Pathology showed prostatic adenocarcinoma, Grade Group 5, Bourg grade 5+5 (Score 10). 7% tissue involvement.     11/05/2024  - CT CAP showed diffuse sclerotic metastasis throughout the visualized skeleton not significantly changed since the 5/1/2024 CT exam.  Postoperative changes from a TURP. Left-sided colonic diverticulosis without acute diverticulitis.  PSA 0.3    - Bone scan showed no significant interval change in diffuse tracer avid osseous metastases.     11/26/2024-PSMA PET with avidity of small lesions.  PSA 0.3    2/3/2025-PSA 1.4.  PSMA PET scan notable for increased number of bony lesions.  [facility].  Pending response from trial team.  Pluvicto is clearly an option here.  We have additional trials in the pipeline, but timeline for opening is not yet clear.  Would also highly consider [id number] at [facility] or [facility] as possible other options.      2/19/25 PSA 2.9- opted to proceed with pluvicto. Cycle 1 on 3/5/25. Concern for impending right hip fracture seen on 3/5/25 pluvicto imaging and underwent an right hip replacement on 03/17/2025 at [facility].     3/31/25 PSA 4.0- cycle 2 pluvicto on 4/16/25.     4/21/25 PSA 4.6    5/12/25 PSA pending  prior to cycle 3 of Pluvicto.  PSA 6.4.  Labs OK to proceed with Dose #3 of Pluvicto.    Will evaluate SPECT and PSMA for progression versus PSA treatment flare.  If progressing on imaging, will discontinue Pluvicto.      6/25/25 visit. Labs from 6/23/25 PSA 11.3, PSMA PET demonstrates progressive bone metastasis. Plan to discontinue Pluvicto.     # R papillary renal cell carcinoma.    -11/28/2023: Right kidney needle core biopsy. Pathology showed renal cell carcinoma, favor papillary type, Grade 2  -5/1/24-Stable right RCC.  -11/5/24-Interval decrease in size of a biopsy-proven renal cell carcinoma in the right kidney. Stable postoperative changes from a left nephrectomy.    GENOMIC STUDIES: NGS from prostate biopsy pending.  Germline testing negative for BRCA1/2.  PODL1 mutation of indeterminate significance.      TREATMENT HISTORY:     ADT  Docetaxel/darolutamide in [facility] setting.    Pluvicto plus darolutamide x3 cycles.      GUIDELINES USED: NCCN    INTENT OF THERAPY: Palliative, discussed at 12/2/24 appointment.     CLINICAL TRIALS:  No available/open trials at initial visit.      PMH:  Papillary RCC, currently monitoring   Prostate cancer     PSH:  L nephrectomy as child for non-malignant etiology     ALLERGIES: Cats, seasonal     MEDS:  Current Outpatient Medications   Medication Instructions    albuterol (PROAIR HFA/PROVENTIL HFA/VENTOLIN HFA) 108 (90 Base) MCG/ACT inhaler 2 puffs    atorvastatin (LIPITOR) 40 MG tablet     Calcium Carbonate (CALCIUM 500 PO) Take  by mouth.    cycloSPORINE (RESTASIS) 0.05 % ophthalmic emulsion 1 drop, 2 TIMES DAILY    FISH OIL No dose, route, or frequency recorded.    fluticasone (FLONASE) 50 MCG/ACT nasal spray     Glucosamine-Chondroit-Vit C-Mn (GLUCOSAMINE 1500 COMPLEX) CAPS Take  by mouth.    loratadine (CLARITIN) 10 mg, DAILY    LORazepam (ATIVAN) 0.5-1 mg    Nubeqa 600 mg    omeprazole (PRILOSEC) 20 MG DR capsule     simvastatin (ZOCOR) 20 MG tablet AT BEDTIME        ROS-Remainder of 14 point ROS reviewed and negative except as in HPI.    PHYSICAL EXAM:  ECOG-PS=1  There were no vitals taken for this visit.  General: Patient appears well in no acute distress.   Skin: No visualized rash or lesions on visualized skin  Eyes: EOMI, no erythema, sclera icterus or discharge noted  Resp: Appears to be breathing comfortably without accessory muscle usage, speaking in full sentences, no cough  MSK: Appears to have normal range of motion based on visualized movements  Neurologic: No apparent tremors, facial movements symmetric  Psych: affect bright, alert and oriented    LABS AND IMAGES:    [facility]    Component  Ref Range & Units 2 d ago   WBC  3.5 - 10.5 x10(9)/L 7.3   RBC  4.32 - 5.72 x10(12)/L 3.79 Low    Hemoglobin  13.5 - 17.5 g/dL 12.4 Low    HCT  38.8 - 50.0 % 36.8 Low    MCV  80.0 - 100.0 fL 97.1   MCH  27.6 - 33.3 pg 32.7   MCHC  31.5 - 35.2 g/dL 33.7   RDW  11.9 - 15.5 % 13.1   Platelets  150 - 450 x10(9)/L 168   Automated NRBC  <=0 /100 WBC 0   Neutrophil Absolute  1.7 - 7.0 10(9)/L 4.5   Lymphocyte Absolute  1.0 - 4.8 10(9)/L 1.5   Monocyte Absolute  0.2 - 0.9 10(9)/L 0.8   Eosinophil Absolute  0.0 - 0.5 10(9)/L 0.5   Basophil Absolute  0.0 - 0.3 10(9)/L 0   Automated Neutrophil Count (Prelim)  10(9)/L 4.5   Comment: The Instrument Absolute Neutrophil Count (IANC) is calculated from the automated differential and may differ slightly from the manual differential Absolute Neutrophil Count (IANC), if subsequently reported.   Immature Granulocyte %  0.0 - 0.5 % 0.3   Resulting Agency [facility]        [facility]  Outside Information     Component  Ref Range & Units 2 d ago   Sodium  136 - 145 mmol/L 141   Potassium  3.5 - 5.1 mmol/L 4.6   Chloride  98 - 109 mmol/L 109   CO2  20 - 29 mmol/L 24   Anion Gap  6 - 16 mmol/L 8   Calcium  8.4 - 10.4 mg/dL 8.6   BUN  7 - 26 mg/dL 10   Creatinine  0.73 - 1.18 mg/dL 0.68 Low    Alkaline Phosphatase  40 - 150 U/L  141   AST (SGOT)  10 - 40 U/L 22   ALT (SGPT)  0 - 55 U/L 23   Bilirubin, Total  0.2 - 1.2 mg/dL 0.7   Protein, Total  6.4 - 8.3 g/dL 6 Low    Albumin  3.5 - 5.0 g/dL 3.4 Low    Glucose  70 - 100 mg/dL 102 High    Comment: The given reference range is for the fasting state. Non-fasting reference range for glucose is 70 - 180 mg/dL.   GFR, Estimated  >60 mL/min/1.73m2 >60   Hours Fasting  8 - 12 Hours 0   Comment: Patient has indicated a non-fasting status.   Resulting Agency [facility]     Specimen Collected: 06/23/25  9:08 AM    Performed by: [facility] Last Resulted: 06/23/25  9:36 AM   Received From: Embedded [facility]  Result Received: 06/25/25  9:51 AM          [facility]  Outside Information      suggestion  Information displayed in this report may not trend or trigger automated decision support.       Contains abnormal data Prostatic Specific Antigen - PSA (Diagnostic F/U) - every 4 weeks  Order: [id number]  Component  Ref Range & Units 2 d ago   Prostatic Specific Antigen  0.0 - 4.0 ng/mL 11.3 High    Resulting Agency [facility]   Narrative  Performed by [facility]         Embedded Chat  Outside Information      suggestion  Information displayed in this report may not trend or trigger automated decision support.      Testosterone,Free,Bioavailable,Total,Adult Males or Individuals on Testosterone Hormone Therapy  Order: [id number]  Component  Ref Range & Units 2 d ago   Sex Hormone Binding Globulin Adult  nmol/L 55   Comment: Reference range not established for this age group   Testosterone  ng/dL 11   Comment: Reference range not established for this age group   Testosterone, Free  ng/dL 0.1   Comment: Reference range not established for this age group   Testosterone, Bioavailable  ng/dL 2.3   Comment: Reference range not established for this age group   Resulting Agency [facility]   Narrative  Performed by [facility]  Sex Hormone  Binding Globulin: Reference range for a 20-69 year old male is 13-74 nmol/L  Total Testosterone: Reference range for a 20-69 year old male is 200 - 745 ng/dL  Free Testosterone: Reference range for a 20-69 year old male is 3.1 - 12.8 ng/dL  Bioavailable Testosterone: Reference range for a 20-69 year old male is 71.7 - 300.0 ng/dL    Specimen Collected: 06/23/25  9:08 AM    Performed by: [facility] Last Resulted: 06/24/25 10:18 AM   Received From: [facility]  Result Received: 06/25/25  9:51 AM     PSMA PET from 6/23/25. Personally reviewed, progression of osseous metastasis with many new and enlarging bone mets such as new clivus and anterior carch of C1, increased bilateral iliac and sacral lesions, increased right ischial tuberosity lesion, left proximal femoral lesions, increased left 7th proximal rib lesion.     IMPRESSION AND PLAN:    # Metastatic, hormone-resistant prostate cancer.    # prostate cancer metastatic to bone    - please see note my note from February 2025 regarding previous treatment and history in more detail.   - Diganosted in fall 2023 with metastatic prostate cancer to the bones began ADT/docetaxel/darolutamide which he tolerated well and continued with darolutamide.  PSA nicol was undetectable while on darolutamide, but began to rise in the fall of 2024.    - seen by our team for a second option trials vs. Pluvicto, vs alternative options.   - 1/22/25 with progressive disease on PSMA PET. Opted to trial pluvicto and received cycle 1 on 3/5/25. He does have baseline uptake in the salivary gland and lacrimal gland uptake on his PSMA pet scan so will need to monitor for dry mouth closely on pluvicto.   - S/p 3 cycles of pluvicto last dose on 5/28/25. Unfortunately, his PSMA scan and rising PSA are both consistent with progressive disease with new and worsening pre-existing bone metastasis. We discussed discontinuing pluvicto at this time. He will return with Dr. Hightower on  6/30/25 to discuss chemotherapy vs. Clinical trial options with close coordination with patients primary oncologist, Dr. Rodriguez at [facility].   - Reviewed Dr. Rodriguez's note from 6/25/25 which is in agreement with our suggestion as well.     PLAN:     Discontinue pluvicto   Return with Dr. Hightower on 6/30.   Patient may opt to defer starting clinical trial until after August travels scheduled the first week of August but will discuss further with Dr. Hightower.     The longitudinal plan of care for the diagnosis(es)/condition(s) as documented were addressed during this visit. Due to the added complexity in care, I will continue to support Jd in the subsequent management and with ongoing continuity of care.    27 minutes spent on the date of the encounter doing chart review, review of test results, interpretation of tests, patient visit, and documentation     Chelsey Whitmore PA-C

## 2025-06-25 NOTE — NURSING NOTE
Patient confirms medications and allergies are accurate via patients echeck in completion, and or denies any changes since last reviewed/verified.     Current patient location: [address] [facility] [address]    Is the patient currently in the state of [place]? YES    Visit mode: VIDEO    If the visit is dropped, the patient can be reconnected by:VIDEO VISIT: Text to cell phone:   Telephone Information:   Mobile [phone]       Will anyone else be joining the visit? NO  (If patient encounters technical issues they should call [phone]    Are changes needed to the allergy or medication list? No    Are refills needed on medications prescribed by this physician? NO    Rooming Documentation:  Questionnaire(s) not done per department protocol    Reason for visit: RECHECK    Soheila LILLY

## 2025-06-25 NOTE — PROGRESS NOTES
Virtual Visit Details    Type of service:  Video Visit   Video Start Time: 10:11 AM  Video End Time:10:25 AM    Originating Location (pt. Location): Home  Distant Location (provider location):  Off-site  Platform used for Video Visit: Walter

## 2025-06-25 NOTE — LETTER
6/25/2025      Jd Restrepo  [address]  [facility] [address]      Dear Colleague,    Thank you for referring your patient, Jd Restrepo, to the [facility]. Please see a copy of my visit note below.      [facility] Medical [facility]cology Return Visit Note       Date of visit: Jun 25, 2025    CC:  follow-up for mCPRC, prior cycle 4 pluvicto    INTERVAL HISTORY:   Jd is seen virtually today. He has plans for travel in early August. Jd reports the right hip is getting slowly better. He is getting around walking better. He hasn't noticed any dry eyes with pluvicto but has been told he has this and uses restasis drops. He does have dry mouth at night. This is not bothersome during the day. He drinks water throughout the night. No diarrhea. He did constipation for a couple of days after his last dose. Appetite is good.     ONCOLOGY HISTORY:    #metastatic prostate cancer.    09/16/2023 Urology visit for lower urinary tract symptoms. Relatively low PSA in the past. 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   - Bladder US showed an enlarged prostate measuring 6.2 x 4.3 x 4.7 cm  - Cystoscopy was completed due to urinary retention. This showed severe trabeculation fo the baldder, he had enlarged prostate with an intravesical protion and lateral lobe enlargement. Discussed TURP procedure. Plan was to undergo a Urodynamic study.     11/16/2023   - PCP visit with Jarvis Gaxiola MD for fever. There was urinary retention and self cathing. Appeared septic with tachycardia, hypotension, and tachypnea. There is some loss of appetite. Referred to ED for admission.   - CT CAP showed no convincing evidence to confirm an etiology for fever in the chest, abdomen, or pelvis. Multiple small indistinct and infiltrative-appearing sclerotic areas throughout the spine, pelvis, and sternum. Metastatic disease cannot be excluded. Prostate-specific antigen elevation from  10/11/2023 (19.9 at that time) raises suspicion for the prostate as a source. Indeterminate density 2.6 cm structure in the right kidney. This could potentially represent a hemorrhagic/proteinaceous cyst, but a hypoenhancing solid mass cannot be excluded.    11/17/2023 Right iliac bone biopsy. Pathology showed metastatic adenocarcinoma, consistent with prostate primary.     12/05/2023 Pet/CT scan showed findings suspicious for left prostate gland adenocarcinoma with widespread osseous metastases. No radiotracer uptake is identified within the biopsy-proven right papillary renal carcinoma.    01/23/2024 No BRACA 1 or 2 mutations. There was a + PODL1 mutation of unknown significance.     05/01/2024  - CT CAP showed no findings for new or progressive sites of metastatic disease. Increased density of diffuse sclerotic metastases may represent healing change.   - Bone scan showed widespread osteoblastic metastatic disease.     06/05/2024 Prostate chips, transurethral resection. Pathology showed prostatic adenocarcinoma, Grade Group 5, Ariela grade 5+5 (Score 10). 7% tissue involvement.     11/05/2024  - CT CAP showed diffuse sclerotic metastasis throughout the visualized skeleton not significantly changed since the 5/1/2024 CT exam.  Postoperative changes from a TURP. Left-sided colonic diverticulosis without acute diverticulitis.  PSA 0.3    - Bone scan showed no significant interval change in diffuse tracer avid osseous metastases.     11/26/2024-PSMA PET with avidity of small lesions.  PSA 0.3    2/3/2025-PSA 1.4.  PSMA PET scan notable for increased number of bony lesions.  [facility].  Pending response from trial team.  Pluvicto is clearly an option here.  We have additional trials in the pipeline, but timeline for opening is not yet clear.  Would also highly consider [id number] at [facility] or [facility] as possible other options.      2/19/25 PSA 2.9- opted to proceed with pluvicto. Cycle 1  on 3/5/25. Concern for impending right hip fracture seen on 3/5/25 pluvicto imaging and underwent an right hip replacement on 03/17/2025 at [facility].     3/31/25 PSA 4.0- cycle 2 pluvicto on 4/16/25.     4/21/25 PSA 4.6    5/12/25 PSA pending prior to cycle 3 of Pluvicto.  PSA 6.4.  Labs OK to proceed with Dose #3 of Pluvicto.    Will evaluate SPECT and PSMA for progression versus PSA treatment flare.  If progressing on imaging, will discontinue Pluvicto.      6/25/25 visit. Labs from 6/23/25 PSA 11.3, PSMA PET demonstrates progressive bone metastasis. Plan to discontinue Pluvicto.     # R papillary renal cell carcinoma.    -11/28/2023: Right kidney needle core biopsy. Pathology showed renal cell carcinoma, favor papillary type, Grade 2  -5/1/24-Stable right RCC.  -11/5/24-Interval decrease in size of a biopsy-proven renal cell carcinoma in the right kidney. Stable postoperative changes from a left nephrectomy.    GENOMIC STUDIES: NGS from prostate biopsy pending.  Germline testing negative for BRCA1/2.  PODL1 mutation of indeterminate significance.      TREATMENT HISTORY:     ADT  Docetaxel/darolutamide in [facility] setting.    Pluvicto plus darolutamide x3 cycles.      GUIDELINES USED: NCCN    INTENT OF THERAPY: Palliative, discussed at 12/2/24 appointment.     CLINICAL TRIALS:  No available/open trials at initial visit.      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     cycloSPORINE (RESTASIS) 0.05 % ophthalmic emulsion 1 drop, 2 TIMES DAILY     FISH OIL No dose, route, or frequency recorded.     fluticasone (FLONASE) 50 MCG/ACT nasal spray       Glucosamine-Chondroit-Vit C-Mn (GLUCOSAMINE 1500 COMPLEX) CAPS Take  by mouth.     loratadine (CLARITIN) 10 mg, DAILY     LORazepam (ATIVAN) 0.5-1 mg     Nubeqa 600 mg     omeprazole (PRILOSEC) 20 MG DR capsule      simvastatin (ZOCOR) 20 MG tablet AT BEDTIME       ROS-Remainder of 14 point ROS reviewed and negative except as in HPI.    PHYSICAL EXAM:  ECOG-PS=1  There were no vitals taken for this visit.  General: Patient appears well in no acute distress.   Skin: No visualized rash or lesions on visualized skin  Eyes: EOMI, no erythema, sclera icterus or discharge noted  Resp: Appears to be breathing comfortably without accessory muscle usage, speaking in full sentences, no cough  MSK: Appears to have normal range of motion based on visualized movements  Neurologic: No apparent tremors, facial movements symmetric  Psych: affect bright, alert and oriented    LABS AND IMAGES:    [facility]    Component  Ref Range & Units 2 d ago   WBC  3.5 - 10.5 x10(9)/L 7.3   RBC  4.32 - 5.72 x10(12)/L 3.79 Low    Hemoglobin  13.5 - 17.5 g/dL 12.4 Low    HCT  38.8 - 50.0 % 36.8 Low    MCV  80.0 - 100.0 fL 97.1   MCH  27.6 - 33.3 pg 32.7   MCHC  31.5 - 35.2 g/dL 33.7   RDW  11.9 - 15.5 % 13.1   Platelets  150 - 450 x10(9)/L 168   Automated NRBC  <=0 /100 WBC 0   Neutrophil Absolute  1.7 - 7.0 10(9)/L 4.5   Lymphocyte Absolute  1.0 - 4.8 10(9)/L 1.5   Monocyte Absolute  0.2 - 0.9 10(9)/L 0.8   Eosinophil Absolute  0.0 - 0.5 10(9)/L 0.5   Basophil Absolute  0.0 - 0.3 10(9)/L 0   Automated Neutrophil Count (Prelim)  10(9)/L 4.5   Comment: The Instrument Absolute Neutrophil Count (IANC) is calculated from the automated differential and may differ slightly from the manual differential Absolute Neutrophil Count (IANC), if subsequently reported.   Immature Granulocyte %  0.0 - 0.5 % 0.3   Resulting Agency [facility]        [facility]  Outside Information     Component  Ref Range & Units 2 d ago   Sodium  136 - 145 mmol/L  141   Potassium  3.5 - 5.1 mmol/L 4.6   Chloride  98 - 109 mmol/L 109   CO2  20 - 29 mmol/L 24   Anion Gap  6 - 16 mmol/L 8   Calcium  8.4 - 10.4 mg/dL 8.6   BUN  7 - 26 mg/dL 10   Creatinine  0.73 - 1.18 mg/dL 0.68 Low    Alkaline Phosphatase  40 - 150 U/L 141   AST (SGOT)  10 - 40 U/L 22   ALT (SGPT)  0 - 55 U/L 23   Bilirubin, Total  0.2 - 1.2 mg/dL 0.7   Protein, Total  6.4 - 8.3 g/dL 6 Low    Albumin  3.5 - 5.0 g/dL 3.4 Low    Glucose  70 - 100 mg/dL 102 High    Comment: The given reference range is for the fasting state. Non-fasting reference range for glucose is 70 - 180 mg/dL.   GFR, Estimated  >60 mL/min/1.73m2 >60   Hours Fasting  8 - 12 Hours 0   Comment: Patient has indicated a non-fasting status.   Resulting Agency [facility]     Specimen Collected: 06/23/25  9:08 AM    Performed by: [facility] Last Resulted: 06/23/25  9:36 AM   Received From: [facility]  Result Received: 06/25/25  9:51 AM          [facility]  Outside Information      suggestion  Information displayed in this report may not trend or trigger automated decision support.       Contains abnormal data Prostatic Specific Antigen - PSA (Diagnostic F/U) - every 4 weeks  Order: [id number]  Component  Ref Range & Units 2 d ago   Prostatic Specific Antigen  0.0 - 4.0 ng/mL 11.3 High    Resulting Agency [facility]   Narrative  Performed by [facility]  Outside Information      suggestion  Information displayed in this report may not trend or trigger automated decision support.      Testosterone,Free,Bioavailable,Total,Adult Males or Individuals on Testosterone Hormone Therapy  Order: [id number]  Component  Ref Range & Units 2 d ago   Sex Hormone Binding Globulin Adult  nmol/L 55   Comment: Reference range not established for this age group   Testosterone  ng/dL 11   Comment: Reference range not established for this age group   Testosterone, Free  ng/dL 0.1   Comment: Reference  range not established for this age group   Testosterone, Bioavailable  ng/dL 2.3   Comment: Reference range not established for this age group   Resulting Agency [facility]   Narrative  Performed by [facility]  Sex Hormone Binding Globulin: Reference range for a 20-69 year old male is 13-74 nmol/L  Total Testosterone: Reference range for a 20-69 year old male is 200 - 745 ng/dL  Free Testosterone: Reference range for a 20-69 year old male is 3.1 - 12.8 ng/dL  Bioavailable Testosterone: Reference range for a 20-69 year old male is 71.7 - 300.0 ng/dL    Specimen Collected: 06/23/25  9:08 AM    Performed by: [facility] Last Resulted: 06/24/25 10:18 AM   Received From: [facility]  Result Received: 06/25/25  9:51 AM     PSMA PET from 6/23/25. Personally reviewed, progression of osseous metastasis with many new and enlarging bone mets such as new clivus and anterior carch of C1, increased bilateral iliac and sacral lesions, increased right ischial tuberosity lesion, left proximal femoral lesions, increased left 7th proximal rib lesion.     IMPRESSION AND PLAN:    # Metastatic, hormone-resistant prostate cancer.    # prostate cancer metastatic to bone    - please see note my note from February 2025 regarding previous treatment and history in more detail.   - Diganosted in fall 2023 with metastatic prostate cancer to the bones began ADT/docetaxel/darolutamide which he tolerated well and continued with darolutamide.  PSA nicol was undetectable while on darolutamide, but began to rise in the fall of 2024.    - seen by our team for a second option trials vs. Pluvicto, vs alternative options.   - 1/22/25 with progressive disease on PSMA PET. Opted to trial pluvicto and received cycle 1 on 3/5/25. He does have baseline uptake in the salivary gland and lacrimal gland uptake on his PSMA pet scan so will need to monitor for dry mouth closely on pluvicto.   - S/p 3 cycles of  pluvicto last dose on 5/28/25. Unfortunately, his PSMA scan and rising PSA are both consistent with progressive disease with new and worsening pre-existing bone metastasis. We discussed discontinuing pluvicto at this time. He will return with Dr. Hightower on 6/30/25 to discuss chemotherapy vs. Clinical trial options with close coordination with patients primary oncologist, Dr. Rodriguez at [facility].   - Reviewed Dr. Rodriguez's note from 6/25/25 which is in agreement with our suggestion as well.     PLAN:     Discontinue pluvicto   Return with Dr. Hightower on 6/30.   Patient may opt to defer starting clinical trial until after August travels scheduled the first week of August but will discuss further with Dr. Hightower.     The longitudinal plan of care for the diagnosis(es)/condition(s) as documented were addressed during this visit. Due to the added complexity in care, I will continue to support Jd in the subsequent management and with ongoing continuity of care.    27 minutes spent on the date of the encounter doing chart review, review of test results, interpretation of tests, patient visit, and documentation     Chelsey Whitmore PA-C          Virtual Visit Details    Type of service:  Video Visit   Video Start Time: 10:11 AM  Video End Time:10:25 AM    Originating Location (pt. Location): Home  Distant Location (provider location):  Off-site  Platform used for Video Visit: Walter    Again, thank you for allowing me to participate in the care of your patient.        Sincerely,        Chelsey Whitmore PA-C    Electronically signed

## 2025-06-29 NOTE — PROGRESS NOTES
[facility] Medical Oncology Return Visit Note       Date of visit: Jun 30, 2025    CC:  follow-up for mCPRC, follow-up after progression on Pluvicto     INTERVAL HISTORY:   Feels pretty good today.  No significant dry mouth.  Appetite and energy are good.  No new pains.  Not dizzy or lightheaded.  Here to discuss next steps today.     ONCOLOGY HISTORY:    #metastatic prostate cancer.    09/16/2023 Urology visit for lower urinary tract symptoms. Relatively low PSA in the past. 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   - Bladder US showed an enlarged prostate measuring 6.2 x 4.3 x 4.7 cm  - Cystoscopy was completed due to urinary retention. This showed severe trabeculation fo the baldder, he had enlarged prostate with an intravesical protion and lateral lobe enlargement. Discussed TURP procedure. Plan was to undergo a Urodynamic study.     11/16/2023   - PCP visit with Jarvis Gaxiola MD for fever. There was urinary retention and self cathing. Appeared septic with tachycardia, hypotension, and tachypnea. There is some loss of appetite. Referred to ED for admission.   - CT CAP showed no convincing evidence to confirm an etiology for fever in the chest, abdomen, or pelvis. Multiple small indistinct and infiltrative-appearing sclerotic areas throughout the spine, pelvis, and sternum. Metastatic disease cannot be excluded. Prostate-specific antigen elevation from 10/11/2023 (19.9 at that time) raises suspicion for the prostate as a source. Indeterminate density 2.6 cm structure in the right kidney. This could potentially represent a hemorrhagic/proteinaceous cyst, but a hypoenhancing solid mass cannot be excluded.    11/17/2023 Right iliac bone biopsy. Pathology showed metastatic adenocarcinoma, consistent with prostate primary.     12/05/2023 Pet/CT scan showed findings suspicious for left prostate gland adenocarcinoma with widespread osseous metastases. No radiotracer uptake is identified within the  biopsy-proven right papillary renal carcinoma.    01/23/2024 No BRACA 1 or 2 mutations. There was a + PODL1 mutation of unknown significance.     05/01/2024  - CT CAP showed no findings for new or progressive sites of metastatic disease. Increased density of diffuse sclerotic metastases may represent healing change.   - Bone scan showed widespread osteoblastic metastatic disease.     06/05/2024 Prostate chips, transurethral resection. Pathology showed prostatic adenocarcinoma, Grade Group 5, [place] grade 5+5 (Score 10). 7% tissue involvement.     11/05/2024  - CT CAP showed diffuse sclerotic metastasis throughout the visualized skeleton not significantly changed since the 5/1/2024 CT exam.  Postoperative changes from a TURP. Left-sided colonic diverticulosis without acute diverticulitis.  PSA 0.3    - Bone scan showed no significant interval change in diffuse tracer avid osseous metastases.     11/26/2024-PSMA PET with avidity of small lesions.  PSA 0.3    2/3/2025-PSA 1.4.  PSMA PET scan notable for increased number of bony lesions.  Considering [facility] clinical trial.  Pending response from trial team.  Pluvicto is clearly an option here.  We have additional trials in the pipeline, but timeline for opening is not yet clear.  Would also highly consider [id number] at [facility] or [facility] as possible other options.      2/19/25 PSA 2.9- opted to proceed with pluvicto. Cycle 1 on 3/5/25. Concern for impending right hip fracture seen on 3/5/25 pluvicto imaging and underwent an right hip replacement on 03/17/2025 at [facility].     3/31/25 PSA 4.0- cycle 2 pluvicto on 4/16/25.     4/21/25 PSA 4.6    5/12/25 PSA pending prior to cycle 3 of Pluvicto.  PSA 6.4.  Labs OK to proceed with Dose #3 of Pluvicto.    Will evaluate SPECT and PSMA for progression versus PSA treatment flare.  If progressing on imaging, will discontinue Pluvicto.      6/25/25 visit. Labs from 6/23/25 PSA 11.3, PSMA PET demonstrates progressive bone  metastasis. Plan to discontinue Pluvicto.     6/30/25.  Follow-up planning visit with [facility].  Pluvicto washout requirement is 2 months, pending 3 months soon with a protocol amendment.  He still requires a taxane in the [facility] setting for [id number] eligibility.  Ineligible for [id number] now post-Pluvicto as that arm has completely filled.  Would need to receive docetaxel or cabazitaxel.     # R papillary renal cell carcinoma.    -11/28/2023: Right kidney needle core biopsy. Pathology showed renal cell carcinoma, favor papillary type, Grade 2  -5/1/24-Stable right RCC.  -11/5/24-Interval decrease in size of a biopsy-proven renal cell carcinoma in the right kidney. Stable postoperative changes from a left nephrectomy.    GENOMIC STUDIES: NGS from prostate biopsy pending.  Germline testing negative for BRCA1/2.  PODL1 mutation of indeterminate significance.      TREATMENT HISTORY:     ADT  Docetaxel/darolutamide in [facility] setting.    Pluvicto plus darolutamide x3 cycles.      GUIDELINES USED: NCCN    INTENT OF THERAPY: Palliative, discussed at 12/2/24 appointment.     CLINICAL TRIALS:  No available/open trials at initial visit.      PMH:  Papillary RCC, currently monitoring   Prostate cancer     PSH:  L nephrectomy as child for non-malignant etiology     ALLERGIES: Cats, seasonal     MEDS:  Current Outpatient Medications   Medication Instructions    albuterol (PROAIR HFA/PROVENTIL HFA/VENTOLIN HFA) 108 (90 Base) MCG/ACT inhaler 2 puffs    atorvastatin (LIPITOR) 40 MG tablet     Calcium Carbonate (CALCIUM 500 PO) Take  by mouth.    cycloSPORINE (RESTASIS) 0.05 % ophthalmic emulsion 1 drop, 2 TIMES DAILY    FISH OIL No dose, route, or frequency recorded.    fluticasone (FLONASE) 50 MCG/ACT nasal spray     Glucosamine-Chondroit-Vit C-Mn (GLUCOSAMINE 1500 COMPLEX) CAPS Take  by mouth.    loratadine (CLARITIN) 10 mg, DAILY    LORazepam (ATIVAN) 0.5-1 mg    Nubeqa 600 mg    omeprazole (PRILOSEC) 20 MG DR capsule     simvastatin  (ZOCOR) 20 MG tablet AT BEDTIME       ROS-Remainder of 14 point ROS reviewed and negative except as in HPI.    PHYSICAL EXAM:  ECOG-PS=1  /75 (BP Location: Right arm, Patient Position: Sitting, Cuff Size: Adult Regular)   Pulse 73   Temp 98.9  F (37.2  C) (Oral)   Resp 16   Wt 80.2 kg (176 lb 11.2 oz)   SpO2 97%   BMI 26.48 kg/m    General: Patient appears well in no acute distress.   Skin: No visualized rash or lesions on visualized skin  Eyes: EOMI, no erythema, sclera icterus or discharge noted  Resp: Appears to be breathing comfortably without accessory muscle usage, speaking in full sentences, no cough  MSK: Appears to have normal range of motion based on visualized movements  Neurologic: No apparent tremors, facial movements symmetric  Psych: affect bright, alert and oriented    Labs:   CMP from 6/23/25 at   Mild decrease in albumin to 3.4.    Normal LFTs, lytes and renal function.     CBC with WBC of 7.3, Hgb of 12.4, and plts 168.  ANC 4.5.      PSMA PET from 6/23/25. Personally reviewed, progression of osseous metastasis with many new and enlarging bone mets such as new clivus and anterior carch of C1, increased bilateral iliac and sacral lesions, increased right ischial tuberosity lesion, left proximal femoral lesions, increased left 7th proximal rib lesion.     IMPRESSION AND PLAN:    # Metastatic, hormone-resistant prostate cancer.    # prostate cancer metastatic to bone    - please see note my note from February 2025 regarding previous treatment and history in more detail.   - Diganosted in fall 2023 with metastatic prostate cancer to the bones began ADT/docetaxel/darolutamide which he tolerated well and continued with darolutamide.  PSA nicol was undetectable while on darolutamide, but began to rise in the fall of 2024.    - seen by our team for a second option trials vs. Pluvicto, vs alternative options.   - 1/22/25 with progressive disease on PSMA PET. Opted to trial pluvicto and  received cycle 1 on 3/5/25. He does have baseline uptake in the salivary gland and lacrimal gland uptake on his PSMA pet scan so will need to monitor for dry mouth closely on pluvicto.   - S/p 3 cycles of pluvicto last dose on 5/28/25. Unfortunately, his PSMA scan and rising PSA are both consistent with progressive disease with new and worsening pre-existing bone metastasis. We discussed discontinuing pluvicto at this time. He will return with Dr. Hightower on 6/30/25 to discuss chemotherapy vs. Clinical trial options with close coordination [facility]   - Reviewed Dr. Rodriguez's note from 6/25/25 which is in agreement with our suggestion as well.     PLAN:     Discontinue Pluvicto.   Discuss docetaxel vs cabazitaxel with Dr. Rodriguez.  This will be required to be eligible for [id number].  Cabazitaxel would remove the option of the cabazitaxel control arm, but you would still be eligible for the androgen receptor targeted therapy switch arm (enzalutamide or abiraterone options) with the randomization.    We have a few options after that, but we need to sequence careful to keep the most options available for you.  I called Dr. Rodriguez's nurse this afternoon also for an update on our visit as well.       A total of 40 minutes spent on the date of the encounter doing chart review, review of test results, interpretation of tests, patient visit, and documentation.  The patient was given the opportunity to ask multiple questions today, all of which were answered to their satisfaction.    The longitudinal plan of care for mCRPC was addressed during this visit. Due to the added complexity in care, I will continue to support Jd Restrepo in the subsequent management of this condition(s) and with the ongoing continuity of care of this condition(s).     Tan Hightower MD, PhD   of Medicine   Oncology/BMT/Cellular Therapies

## 2025-06-30 ENCOUNTER — ONCOLOGY VISIT (OUTPATIENT)
Dept: ONCOLOGY | Facility: CLINIC | Age: 75
End: 2025-06-30
Attending: STUDENT IN AN ORGANIZED HEALTH CARE EDUCATION/TRAINING PROGRAM
Payer: COMMERCIAL

## 2025-06-30 VITALS
HEART RATE: 73 BPM | SYSTOLIC BLOOD PRESSURE: 125 MMHG | BODY MASS INDEX: 26.48 KG/M2 | TEMPERATURE: 98.9 F | WEIGHT: 176.7 LBS | DIASTOLIC BLOOD PRESSURE: 75 MMHG | OXYGEN SATURATION: 97 % | RESPIRATION RATE: 16 BRPM

## 2025-06-30 DIAGNOSIS — Z19.2 HORMONE RESISTANT PROSTATE CANCER (H): ICD-10-CM

## 2025-06-30 DIAGNOSIS — C61 HORMONE RESISTANT PROSTATE CANCER (H): ICD-10-CM

## 2025-06-30 DIAGNOSIS — C61 PROSTATE CANCER METASTATIC TO BONE (H): Primary | ICD-10-CM

## 2025-06-30 DIAGNOSIS — C79.51 PROSTATE CANCER METASTATIC TO BONE (H): Primary | ICD-10-CM

## 2025-06-30 PROCEDURE — G0463 HOSPITAL OUTPT CLINIC VISIT: HCPCS | Performed by: STUDENT IN AN ORGANIZED HEALTH CARE EDUCATION/TRAINING PROGRAM

## 2025-06-30 ASSESSMENT — PAIN SCALES - GENERAL: PAINLEVEL_OUTOF10: NO PAIN (0)

## 2025-06-30 NOTE — NURSING NOTE
"Oncology Rooming Note    June 30, 2025 11:31 AM   Jd Restrepo is a 74 year old male who presents for:    Chief Complaint   Patient presents with    Oncology Clinic Visit     Metastatic castration-resistant adenocarcinoma of prostate     Initial Vitals: /75 (BP Location: Right arm, Patient Position: Sitting, Cuff Size: Adult Regular)   Pulse 73   Temp 98.9  F (37.2  C) (Oral)   Resp 16   Wt 80.2 kg (176 lb 11.2 oz)   SpO2 97%   BMI 26.48 kg/m   Estimated body mass index is 26.48 kg/m  as calculated from the following:    Height as of 6/25/25: 1.74 m (5' 8.5\").    Weight as of this encounter: 80.2 kg (176 lb 11.2 oz). Body surface area is 1.97 meters squared.  No Pain (0) Comment: Data Unavailable   No LMP for male patient.  Allergies reviewed: Yes  Medications reviewed: Yes    Medications: Medication refills not needed today.  Pharmacy name entered into [facility]: [facility] [id number] [place] [address]    Frailty Screening:   Is the patient here for a new oncology consult visit in cancer care? 2. No    PHQ9:  Did this patient require a PHQ9?: No      Clinical concerns:  none      Betty Carcamo            "

## 2025-06-30 NOTE — LETTER
6/30/2025      Jd Restrepo  [address]  [facility] [place] [facility] [address]      Dear Colleague,    Thank you for referring your patient, Jd Restrepo, to the [facility]. Please see a copy of my visit note below.      [facility] Medical Oncology Return Visit Note       Date of visit: Jun 30, 2025    CC:  follow-up for mCPRC, follow-up after progression on Pluvicto     INTERVAL HISTORY:   Feels pretty good today.  No significant dry mouth.  Appetite and energy are good.  No new pains.  Not dizzy or lightheaded.  Here to discuss next steps today.     ONCOLOGY HISTORY:    #metastatic prostate cancer.    09/16/2023 Urology visit for lower urinary tract symptoms. Relatively low PSA in the past. 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   - Bladder US showed an enlarged prostate measuring 6.2 x 4.3 x 4.7 cm  - Cystoscopy was completed due to urinary retention. This showed severe trabeculation fo the baldder, he had enlarged prostate with an intravesical protion and lateral lobe enlargement. Discussed TURP procedure. Plan was to undergo a Urodynamic study.     11/16/2023   - PCP visit with Jarvis Gaxiola MD for fever. There was urinary retention and self cathing. Appeared septic with tachycardia, hypotension, and tachypnea. There is some loss of appetite. Referred to ED for admission.   - CT CAP showed no convincing evidence to confirm an etiology for fever in the chest, abdomen, or pelvis. Multiple small indistinct and infiltrative-appearing sclerotic areas throughout the spine, pelvis, and sternum. Metastatic disease cannot be excluded. Prostate-specific antigen elevation from 10/11/2023 (19.9 at that time) raises suspicion for the prostate as a source. Indeterminate density 2.6 cm structure in the right kidney. This could potentially represent a hemorrhagic/proteinaceous cyst, but a hypoenhancing solid mass cannot be excluded.    11/17/2023 Right iliac bone biopsy.  Pathology showed metastatic adenocarcinoma, consistent with prostate primary.     12/05/2023 Pet/CT scan showed findings suspicious for left prostate gland adenocarcinoma with widespread osseous metastases. No radiotracer uptake is identified within the biopsy-proven right papillary renal carcinoma.    01/23/2024 No BRACA 1 or 2 mutations. There was a + PODL1 mutation of unknown significance.     05/01/2024  - CT CAP showed no findings for new or progressive sites of metastatic disease. Increased density of diffuse sclerotic metastases may represent healing change.   - Bone scan showed widespread osteoblastic metastatic disease.     06/05/2024 Prostate chips, transurethral resection. Pathology showed prostatic adenocarcinoma, Grade Group 5, Tully grade 5+5 (Score 10). 7% tissue involvement.     11/05/2024  - CT CAP showed diffuse sclerotic metastasis throughout the visualized skeleton not significantly changed since the 5/1/2024 CT exam.  Postoperative changes from a TURP. Left-sided colonic diverticulosis without acute diverticulitis.  PSA 0.3    - Bone scan showed no significant interval change in diffuse tracer avid osseous metastases.     11/26/2024-PSMA PET with avidity of small lesions.  PSA 0.3    2/3/2025-PSA 1.4.  PSMA PET scan notable for increased number of bony lesions.  Considering [facility] clinical trial.  Pending response from trial team.  Pluvicto is clearly an option here.  We have additional trials in the pipeline, but timeline for opening is not yet clear.  Would also highly consider [id number] at [facility] or [facility] as possible other options.      2/19/25 PSA 2.9- opted to proceed with pluvicto. Cycle 1 on 3/5/25. Concern for impending right hip fracture seen on 3/5/25 pluvicto imaging and underwent an right hip replacement on 03/17/2025 at [facility].     3/31/25 PSA 4.0- cycle 2 pluvicto on 4/16/25.     4/21/25 PSA 4.6    5/12/25 PSA pending prior to cycle 3 of Pluvicto.  PSA 6.4.  Labs OK  to proceed with Dose #3 of Pluvicto.    Will evaluate SPECT and PSMA for progression versus PSA treatment flare.  If progressing on imaging, will discontinue Pluvicto.      6/25/25 visit. Labs from 6/23/25 PSA 11.3, PSMA PET demonstrates progressive bone metastasis. Plan to discontinue Pluvicto.     6/30/25.  Follow-up planning visit with [facility].  Pluvicto washout requirement is 2 months, pending 3 months soon with a protocol amendment.  He still requires a taxane in the [facility] setting for [id number] eligibility.  Ineligible for JANX-007 now post-Pluvicto as that arm has completely filled.  Would need to receive docetaxel or cabazitaxel.     # R papillary renal cell carcinoma.    -11/28/2023: Right kidney needle core biopsy. Pathology showed renal cell carcinoma, favor papillary type, Grade 2  -5/1/24-Stable right RCC.  -11/5/24-Interval decrease in size of a biopsy-proven renal cell carcinoma in the right kidney. Stable postoperative changes from a left nephrectomy.    GENOMIC STUDIES: NGS from prostate biopsy pending.  Germline testing negative for BRCA1/2.  PODL1 mutation of indeterminate significance.      TREATMENT HISTORY:     ADT  Docetaxel/darolutamide in [facility] setting.    Pluvicto plus darolutamide x3 cycles.      GUIDELINES USED: NCCN    INTENT OF THERAPY: Palliative, discussed at 12/2/24 appointment.     CLINICAL TRIALS:  No available/open trials at initial visit.      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     cycloSPORINE (RESTASIS) 0.05 % ophthalmic emulsion 1 drop, 2 TIMES DAILY     FISH OIL No dose, route, or frequency recorded.     fluticasone  (FLONASE) 50 MCG/ACT nasal spray      Glucosamine-Chondroit-Vit C-Mn (GLUCOSAMINE 1500 COMPLEX) CAPS Take  by mouth.     loratadine (CLARITIN) 10 mg, DAILY     LORazepam (ATIVAN) 0.5-1 mg     Nubeqa 600 mg     omeprazole (PRILOSEC) 20 MG DR capsule      simvastatin (ZOCOR) 20 MG tablet AT BEDTIME       ROS-Remainder of 14 point ROS reviewed and negative except as in HPI.    PHYSICAL EXAM:  ECOG-PS=1  /75 (BP Location: Right arm, Patient Position: Sitting, Cuff Size: Adult Regular)   Pulse 73   Temp 98.9  F (37.2  C) (Oral)   Resp 16   Wt 80.2 kg (176 lb 11.2 oz)   SpO2 97%   BMI 26.48 kg/m    General: Patient appears well in no acute distress.   Skin: No visualized rash or lesions on visualized skin  Eyes: EOMI, no erythema, sclera icterus or discharge noted  Resp: Appears to be breathing comfortably without accessory muscle usage, speaking in full sentences, no cough  MSK: Appears to have normal range of motion based on visualized movements  Neurologic: No apparent tremors, facial movements symmetric  Psych: affect bright, alert and oriented    Labs:   CMP from 6/23/25 at [facility]  Mild decrease in albumin to 3.4.    Normal LFTs, lytes and renal function.     CBC with WBC of 7.3, Hgb of 12.4, and plts 168.  ANC 4.5.      PSMA PET from 6/23/25. Personally reviewed, progression of osseous metastasis with many new and enlarging bone mets such as new clivus and anterior carch of C1, increased bilateral iliac and sacral lesions, increased right ischial tuberosity lesion, left proximal femoral lesions, increased left 7th proximal rib lesion.     IMPRESSION AND PLAN:    # Metastatic, hormone-resistant prostate cancer.    # prostate cancer metastatic to bone    - please see note my note from February 2025 regarding previous treatment and history in more detail.   - Diganosted in fall 2023 with metastatic prostate cancer to the bones began ADT/docetaxel/darolutamide which he tolerated well and continued with  darolutamide.  PSA nicol was undetectable while on darolutamide, but began to rise in the fall of 2024.    - seen by our team for a second option trials vs. Pluvicto, vs alternative options.   - 1/22/25 with progressive disease on PSMA PET. Opted to trial pluvicto and received cycle 1 on 3/5/25. He does have baseline uptake in the salivary gland and lacrimal gland uptake on his PSMA pet scan so will need to monitor for dry mouth closely on pluvicto.   - S/p 3 cycles of pluvicto last dose on 5/28/25. Unfortunately, his PSMA scan and rising PSA are both consistent with progressive disease with new and worsening pre-existing bone metastasis. We discussed discontinuing pluvicto at this time. He will return with Dr. Hightower on 6/30/25 to discuss chemotherapy vs. Clinical trial options with close coordination with patients primary oncologist, Dr. Rodriguez at [facility].   - Reviewed Dr. Rodriguez's note from 6/25/25 which is in agreement with our suggestion as well.     PLAN:     Discontinue Pluvicto.   Discuss docetaxel vs cabazitaxel with Dr. Rodriguez.  This will be required to be eligible for [id number].  Cabazitaxel would remove the option of the cabazitaxel control arm, but you would still be eligible for the androgen receptor targeted therapy switch arm (enzalutamide or abiraterone options) with the randomization.    We have a few options after that, but we need to sequence careful to keep the most options available for you.  I called Dr. Rodriguez's nurse this afternoon also for an update on our visit as well.       A total of 40 minutes spent on the date of the encounter doing chart review, review of test results, interpretation of tests, patient visit, and documentation.  The patient was given the opportunity to ask multiple questions today, all of which were answered to their satisfaction.    The longitudinal plan of care for mCRPC was addressed during this visit. Due to the added complexity in care, I will continue to support  Jd Restrepo in the subsequent management of this condition(s) and with the ongoing continuity of care of this condition(s).     Tan Hightower MD, PhD   of Medicine   Oncology/BMT/Cellular Therapies          Again, thank you for allowing me to participate in the care of your patient.        Sincerely,        Tan Hightower MD    Electronically signed

## 2025-07-01 NOTE — PATIENT INSTRUCTIONS
Jd,   Here is what we discussed yesterday:     Discontinue Pluvicto.   Discuss docetaxel vs cabazitaxel with Dr. Rodriguez.  This will be required to be eligible for [id number].  Cabazitaxel would remove the option of the cabazitaxel control arm, but you would still be eligible for the androgen receptor targeted therapy switch arm (enzalutamide or abiraterone options) with the randomization.    We have a few options after that, but we need to sequence careful to keep the most options available for you.  I called Dr. Rodriguez's nurse this afternoon also for an update on our visit as well.